# Patient Record
Sex: MALE | Race: OTHER | NOT HISPANIC OR LATINO
[De-identification: names, ages, dates, MRNs, and addresses within clinical notes are randomized per-mention and may not be internally consistent; named-entity substitution may affect disease eponyms.]

---

## 2017-02-16 ENCOUNTER — NON-APPOINTMENT (OUTPATIENT)
Age: 67
End: 2017-02-16

## 2017-02-16 ENCOUNTER — APPOINTMENT (OUTPATIENT)
Dept: CARDIOLOGY | Facility: HOSPITAL | Age: 67
End: 2017-02-16

## 2017-02-16 VITALS
DIASTOLIC BLOOD PRESSURE: 78 MMHG | BODY MASS INDEX: 24.83 KG/M2 | OXYGEN SATURATION: 97 % | RESPIRATION RATE: 16 BRPM | WEIGHT: 178 LBS | SYSTOLIC BLOOD PRESSURE: 165 MMHG | HEART RATE: 74 BPM

## 2017-02-16 RX ORDER — METOPROLOL TARTRATE 50 MG/1
50 TABLET, FILM COATED ORAL
Qty: 60 | Refills: 0 | Status: ACTIVE | COMMUNITY
Start: 2017-02-16

## 2017-02-16 RX ORDER — ASPIRIN 81 MG/1
81 TABLET ORAL DAILY
Qty: 30 | Refills: 0 | Status: ACTIVE | COMMUNITY
Start: 2017-02-16

## 2017-03-09 ENCOUNTER — APPOINTMENT (OUTPATIENT)
Dept: CV DIAGNOSTICS | Facility: HOSPITAL | Age: 67
End: 2017-03-09

## 2017-03-30 ENCOUNTER — APPOINTMENT (OUTPATIENT)
Dept: CARDIOLOGY | Facility: HOSPITAL | Age: 67
End: 2017-03-30

## 2017-03-30 VITALS
HEART RATE: 60 BPM | OXYGEN SATURATION: 97 % | BODY MASS INDEX: 25.24 KG/M2 | SYSTOLIC BLOOD PRESSURE: 125 MMHG | DIASTOLIC BLOOD PRESSURE: 74 MMHG | RESPIRATION RATE: 16 BRPM | WEIGHT: 181 LBS

## 2017-04-01 ENCOUNTER — OUTPATIENT (OUTPATIENT)
Dept: OUTPATIENT SERVICES | Facility: HOSPITAL | Age: 67
LOS: 1 days | End: 2017-04-01
Payer: MEDICAID

## 2017-04-01 DIAGNOSIS — Z98.89 OTHER SPECIFIED POSTPROCEDURAL STATES: Chronic | ICD-10-CM

## 2017-04-19 DIAGNOSIS — R69 ILLNESS, UNSPECIFIED: ICD-10-CM

## 2017-05-01 PROCEDURE — G9001: CPT

## 2017-05-24 ENCOUNTER — EMERGENCY (EMERGENCY)
Facility: HOSPITAL | Age: 67
LOS: 1 days | Discharge: HOME | End: 2017-05-24
Admitting: EMERGENCY MEDICINE
Payer: MEDICARE

## 2017-05-24 VITALS
OXYGEN SATURATION: 98 % | RESPIRATION RATE: 75 BRPM | SYSTOLIC BLOOD PRESSURE: 141 MMHG | DIASTOLIC BLOOD PRESSURE: 65 MMHG | TEMPERATURE: 98 F | HEART RATE: 85 BPM

## 2017-05-24 DIAGNOSIS — Z98.89 OTHER SPECIFIED POSTPROCEDURAL STATES: Chronic | ICD-10-CM

## 2017-05-24 LAB
ALBUMIN SERPL ELPH-MCNC: 4.3 G/DL — SIGNIFICANT CHANGE UP (ref 3.3–5)
ALP SERPL-CCNC: 73 U/L — SIGNIFICANT CHANGE UP (ref 40–120)
ALT FLD-CCNC: 36 U/L — SIGNIFICANT CHANGE UP (ref 4–41)
APPEARANCE UR: CLEAR — SIGNIFICANT CHANGE UP
AST SERPL-CCNC: 42 U/L — HIGH (ref 4–40)
B-OH-BUTYR SERPL-SCNC: 0.1 MMOL/L — SIGNIFICANT CHANGE UP (ref 0–0.4)
BASE EXCESS BLDV CALC-SCNC: 0.5 MMOL/L — SIGNIFICANT CHANGE UP
BASOPHILS # BLD AUTO: 0.03 K/UL — SIGNIFICANT CHANGE UP (ref 0–0.2)
BASOPHILS NFR BLD AUTO: 0.6 % — SIGNIFICANT CHANGE UP (ref 0–2)
BILIRUB SERPL-MCNC: 0.4 MG/DL — SIGNIFICANT CHANGE UP (ref 0.2–1.2)
BILIRUB UR-MCNC: NEGATIVE — SIGNIFICANT CHANGE UP
BLOOD GAS VENOUS - CREATININE: 0.73 MG/DL — SIGNIFICANT CHANGE UP (ref 0.5–1.3)
BLOOD UR QL VISUAL: NEGATIVE — SIGNIFICANT CHANGE UP
BUN SERPL-MCNC: 19 MG/DL — SIGNIFICANT CHANGE UP (ref 7–23)
CALCIUM SERPL-MCNC: 10 MG/DL — SIGNIFICANT CHANGE UP (ref 8.4–10.5)
CHLORIDE BLDV-SCNC: 98 MMOL/L — SIGNIFICANT CHANGE UP (ref 96–108)
CHLORIDE SERPL-SCNC: 94 MMOL/L — LOW (ref 98–107)
CO2 SERPL-SCNC: 26 MMOL/L — SIGNIFICANT CHANGE UP (ref 22–31)
COLOR SPEC: SIGNIFICANT CHANGE UP
CREAT SERPL-MCNC: 1.08 MG/DL — SIGNIFICANT CHANGE UP (ref 0.5–1.3)
EOSINOPHIL # BLD AUTO: 0.1 K/UL — SIGNIFICANT CHANGE UP (ref 0–0.5)
EOSINOPHIL NFR BLD AUTO: 2.1 % — SIGNIFICANT CHANGE UP (ref 0–6)
GAS PNL BLDV: 133 MMOL/L — LOW (ref 136–146)
GLUCOSE BLDV-MCNC: 391 — HIGH (ref 70–99)
GLUCOSE SERPL-MCNC: 474 MG/DL — CRITICAL HIGH (ref 70–99)
GLUCOSE UR-MCNC: >1000 — SIGNIFICANT CHANGE UP
HBA1C BLD-MCNC: 13.2 % — HIGH (ref 4–5.6)
HCO3 BLDV-SCNC: 23 MMOL/L — SIGNIFICANT CHANGE UP (ref 20–27)
HCT VFR BLD CALC: 37.1 % — LOW (ref 39–50)
HCT VFR BLDV CALC: 36.3 % — LOW (ref 39–51)
HGB BLD-MCNC: 11.7 G/DL — LOW (ref 13–17)
HGB BLDV-MCNC: 11.8 G/DL — LOW (ref 13–17)
IMM GRANULOCYTES NFR BLD AUTO: 0.2 % — SIGNIFICANT CHANGE UP (ref 0–1.5)
KETONES UR-MCNC: NEGATIVE — SIGNIFICANT CHANGE UP
LACTATE BLDV-MCNC: 2.9 MMOL/L — HIGH (ref 0.5–2)
LEUKOCYTE ESTERASE UR-ACNC: NEGATIVE — SIGNIFICANT CHANGE UP
LYMPHOCYTES # BLD AUTO: 2.15 K/UL — SIGNIFICANT CHANGE UP (ref 1–3.3)
LYMPHOCYTES # BLD AUTO: 44.9 % — HIGH (ref 13–44)
MCHC RBC-ENTMCNC: 27 PG — SIGNIFICANT CHANGE UP (ref 27–34)
MCHC RBC-ENTMCNC: 31.5 % — LOW (ref 32–36)
MCV RBC AUTO: 85.5 FL — SIGNIFICANT CHANGE UP (ref 80–100)
MONOCYTES # BLD AUTO: 0.56 K/UL — SIGNIFICANT CHANGE UP (ref 0–0.9)
MONOCYTES NFR BLD AUTO: 11.7 % — SIGNIFICANT CHANGE UP (ref 2–14)
NEUTROPHILS # BLD AUTO: 1.94 K/UL — SIGNIFICANT CHANGE UP (ref 1.8–7.4)
NEUTROPHILS NFR BLD AUTO: 40.5 % — LOW (ref 43–77)
NITRITE UR-MCNC: NEGATIVE — SIGNIFICANT CHANGE UP
PCO2 BLDV: 53 MMHG — HIGH (ref 41–51)
PH BLDV: 7.31 PH — LOW (ref 7.32–7.43)
PH UR: 6.5 — SIGNIFICANT CHANGE UP (ref 4.6–8)
PLATELET # BLD AUTO: 164 K/UL — SIGNIFICANT CHANGE UP (ref 150–400)
PMV BLD: 11.3 FL — SIGNIFICANT CHANGE UP (ref 7–13)
PO2 BLDV: 35 MMHG — SIGNIFICANT CHANGE UP (ref 35–40)
POTASSIUM BLDV-SCNC: 4.1 MMOL/L — SIGNIFICANT CHANGE UP (ref 3.4–4.5)
POTASSIUM SERPL-MCNC: 4.3 MMOL/L — SIGNIFICANT CHANGE UP (ref 3.5–5.3)
POTASSIUM SERPL-SCNC: 4.3 MMOL/L — SIGNIFICANT CHANGE UP (ref 3.5–5.3)
PROT SERPL-MCNC: 7.8 G/DL — SIGNIFICANT CHANGE UP (ref 6–8.3)
PROT UR-MCNC: NEGATIVE — SIGNIFICANT CHANGE UP
RBC # BLD: 4.34 M/UL — SIGNIFICANT CHANGE UP (ref 4.2–5.8)
RBC # FLD: 14.1 % — SIGNIFICANT CHANGE UP (ref 10.3–14.5)
SAO2 % BLDV: 59.7 % — LOW (ref 60–85)
SODIUM SERPL-SCNC: 134 MMOL/L — LOW (ref 135–145)
SP GR SPEC: 1.02 — SIGNIFICANT CHANGE UP (ref 1–1.03)
UROBILINOGEN FLD QL: NORMAL E.U. — SIGNIFICANT CHANGE UP (ref 0.1–0.2)
WBC # BLD: 4.79 K/UL — SIGNIFICANT CHANGE UP (ref 3.8–10.5)
WBC # FLD AUTO: 4.79 K/UL — SIGNIFICANT CHANGE UP (ref 3.8–10.5)

## 2017-05-24 PROCEDURE — 71260 CT THORAX DX C+: CPT | Mod: 26

## 2017-05-24 PROCEDURE — 99220: CPT | Mod: GC

## 2017-05-24 PROCEDURE — 71020: CPT | Mod: 26

## 2017-05-24 RX ORDER — AZITHROMYCIN 500 MG/1
500 TABLET, FILM COATED ORAL ONCE
Qty: 0 | Refills: 0 | Status: COMPLETED | OUTPATIENT
Start: 2017-05-24 | End: 2017-05-24

## 2017-05-24 RX ORDER — GABAPENTIN 400 MG/1
400 CAPSULE ORAL THREE TIMES A DAY
Qty: 0 | Refills: 0 | Status: DISCONTINUED | OUTPATIENT
Start: 2017-05-24 | End: 2017-05-28

## 2017-05-24 RX ORDER — SODIUM CHLORIDE 9 MG/ML
1000 INJECTION INTRAMUSCULAR; INTRAVENOUS; SUBCUTANEOUS ONCE
Qty: 0 | Refills: 0 | Status: COMPLETED | OUTPATIENT
Start: 2017-05-24 | End: 2017-05-24

## 2017-05-24 RX ORDER — MONTELUKAST 4 MG/1
10 TABLET, CHEWABLE ORAL DAILY
Qty: 0 | Refills: 0 | Status: DISCONTINUED | OUTPATIENT
Start: 2017-05-24 | End: 2017-05-28

## 2017-05-24 RX ORDER — FENOFIBRATE,MICRONIZED 130 MG
145 CAPSULE ORAL DAILY
Qty: 0 | Refills: 0 | Status: DISCONTINUED | OUTPATIENT
Start: 2017-05-24 | End: 2017-05-28

## 2017-05-24 RX ORDER — METOPROLOL TARTRATE 50 MG
50 TABLET ORAL
Qty: 0 | Refills: 0 | Status: DISCONTINUED | OUTPATIENT
Start: 2017-05-24 | End: 2017-05-28

## 2017-05-24 RX ORDER — FAMOTIDINE 10 MG/ML
20 INJECTION INTRAVENOUS
Qty: 0 | Refills: 0 | Status: DISCONTINUED | OUTPATIENT
Start: 2017-05-24 | End: 2017-05-28

## 2017-05-24 RX ORDER — IPRATROPIUM/ALBUTEROL SULFATE 18-103MCG
3 AEROSOL WITH ADAPTER (GRAM) INHALATION ONCE
Qty: 0 | Refills: 0 | Status: COMPLETED | OUTPATIENT
Start: 2017-05-24 | End: 2017-05-24

## 2017-05-24 RX ORDER — ASPIRIN/CALCIUM CARB/MAGNESIUM 324 MG
81 TABLET ORAL DAILY
Qty: 0 | Refills: 0 | Status: DISCONTINUED | OUTPATIENT
Start: 2017-05-24 | End: 2017-05-28

## 2017-05-24 RX ORDER — SIMVASTATIN 20 MG/1
20 TABLET, FILM COATED ORAL AT BEDTIME
Qty: 0 | Refills: 0 | Status: DISCONTINUED | OUTPATIENT
Start: 2017-05-24 | End: 2017-05-28

## 2017-05-24 RX ADMIN — SODIUM CHLORIDE 1000 MILLILITER(S): 9 INJECTION INTRAMUSCULAR; INTRAVENOUS; SUBCUTANEOUS at 23:55

## 2017-05-24 RX ADMIN — AZITHROMYCIN 500 MILLIGRAM(S): 500 TABLET, FILM COATED ORAL at 23:54

## 2017-05-24 RX ADMIN — SODIUM CHLORIDE 1000 MILLILITER(S): 9 INJECTION INTRAMUSCULAR; INTRAVENOUS; SUBCUTANEOUS at 17:49

## 2017-05-24 RX ADMIN — SODIUM CHLORIDE 1000 MILLILITER(S): 9 INJECTION INTRAMUSCULAR; INTRAVENOUS; SUBCUTANEOUS at 20:26

## 2017-05-24 RX ADMIN — Medication 3 MILLILITER(S): at 11:17

## 2017-05-24 RX ADMIN — SIMVASTATIN 20 MILLIGRAM(S): 20 TABLET, FILM COATED ORAL at 23:54

## 2017-05-24 NOTE — ED PROVIDER NOTE - PROGRESS NOTE DETAILS
MAHESH Sheets: pt improved s/p neb, pending CXR MAHESH jerome: CXR abnormal, spoke with Dr Valladares, possible that these are old changes however there is no previous CXR. Findings could represent infection but are suspicious for mass. Pt glucose also in 400s. Pt states he is on insulin 15 u BID and metformin daily, unsure of his finger sticks at home says his machine is "broken". Has never seen endo in the past. PMD is Dr. Car.

## 2017-05-24 NOTE — ED PROVIDER NOTE - ATTENDING CONTRIBUTION TO CARE
agree with PA note  65 yo M pmhx of DM HTN HLD here for cough x 1 week.  Denies travel, sick contacts, hemoptysis.    PE: well appearing, VSS. CTAB/L; s1 s2 no m/r/g abd soft/NT/ND

## 2017-05-24 NOTE — ED PROVIDER NOTE - MEDICAL DECISION MAKING DETAILS
65 yo M here for cough x 1 week. pt with faint expiratory wheezes on exam, VSS, otherwise well. PLAN: duonebs, cxr

## 2017-05-24 NOTE — ED PROVIDER NOTE - CHPI ED SYMPTOMS NEG
no wheezing/no headache/no hemoptysis/no edema/no diaphoresis/no chest pain/no body aches/no shortness of breath/no chills

## 2017-05-24 NOTE — ED PROVIDER NOTE - OBJECTIVE STATEMENT
67 yo M pmhx of DM HTN HLD here for cough x 1 week. Pt reports over the past week he has had a dry non productive cough. No meds taken for cough. Felt tactile temp last night, took tylenol. Denies sick contacts/travel. Able to tolerate PO. Normal UOP and BM. Pt states he is otherwise well but the cough bothers him when he sleeps. Denies vomiting, diarrhea, cp, SOB, dizziness, HA, weakness, abdominal pain, rash 65 yo M pmhx of DM HTN HLD here for cough x 1 week. Pt reports over the past week he has had a dry non productive cough. No meds taken for cough. Felt tactile temp last night, took tylenol. Denies sick contacts/travel. Able to tolerate PO. Normal UOP and BM. Pt states he is otherwise well but the cough bothers him when he sleeps. Denies travel/sick contacts. Denies hx of TB, contact with TB patient. Denies vomiting, diarrhea, cp, SOB, dizziness, HA, weakness, abdominal pain, rash

## 2017-05-25 VITALS
TEMPERATURE: 98 F | OXYGEN SATURATION: 97 % | DIASTOLIC BLOOD PRESSURE: 78 MMHG | RESPIRATION RATE: 14 BRPM | HEART RATE: 73 BPM | SYSTOLIC BLOOD PRESSURE: 143 MMHG

## 2017-05-25 DIAGNOSIS — Z98.890 OTHER SPECIFIED POSTPROCEDURAL STATES: Chronic | ICD-10-CM

## 2017-05-25 DIAGNOSIS — I10 ESSENTIAL (PRIMARY) HYPERTENSION: ICD-10-CM

## 2017-05-25 DIAGNOSIS — E11.8 TYPE 2 DIABETES MELLITUS WITH UNSPECIFIED COMPLICATIONS: ICD-10-CM

## 2017-05-25 DIAGNOSIS — E78.5 HYPERLIPIDEMIA, UNSPECIFIED: ICD-10-CM

## 2017-05-25 LAB
BASE EXCESS BLDV CALC-SCNC: 2.4 MMOL/L — SIGNIFICANT CHANGE UP
BLOOD GAS VENOUS - CREATININE: 0.72 MG/DL — SIGNIFICANT CHANGE UP (ref 0.5–1.3)
CHLORIDE BLDV-SCNC: 108 MMOL/L — SIGNIFICANT CHANGE UP (ref 96–108)
GAS PNL BLDV: 135 MMOL/L — LOW (ref 136–146)
GLUCOSE BLDV-MCNC: 240 — HIGH (ref 70–99)
HCO3 BLDV-SCNC: 25 MMOL/L — SIGNIFICANT CHANGE UP (ref 20–27)
HCT VFR BLDV CALC: 32.8 % — LOW (ref 39–51)
HGB BLDV-MCNC: 10.6 G/DL — LOW (ref 13–17)
LACTATE BLDV-MCNC: 1.8 MMOL/L — SIGNIFICANT CHANGE UP (ref 0.5–2)
PCO2 BLDV: 51 MMHG — SIGNIFICANT CHANGE UP (ref 41–51)
PH BLDV: 7.35 PH — SIGNIFICANT CHANGE UP (ref 7.32–7.43)
PO2 BLDV: 31 MMHG — LOW (ref 35–40)
POTASSIUM BLDV-SCNC: 4.4 MMOL/L — SIGNIFICANT CHANGE UP (ref 3.4–4.5)
SAO2 % BLDV: 51.5 % — LOW (ref 60–85)

## 2017-05-25 PROCEDURE — 99285 EMERGENCY DEPT VISIT HI MDM: CPT | Mod: GC

## 2017-05-25 PROCEDURE — 99217: CPT | Mod: GC

## 2017-05-25 RX ORDER — INSULIN LISPRO 100/ML
VIAL (ML) SUBCUTANEOUS
Qty: 0 | Refills: 0 | Status: DISCONTINUED | OUTPATIENT
Start: 2017-05-25 | End: 2017-05-28

## 2017-05-25 RX ORDER — INSULIN LISPRO 100/ML
6 VIAL (ML) SUBCUTANEOUS
Qty: 0 | Refills: 0 | Status: DISCONTINUED | OUTPATIENT
Start: 2017-05-25 | End: 2017-05-28

## 2017-05-25 RX ORDER — ALBUTEROL 90 UG/1
2 AEROSOL, METERED ORAL
Qty: 1 | Refills: 0 | OUTPATIENT
Start: 2017-05-25 | End: 2017-06-24

## 2017-05-25 RX ORDER — DEXTROSE 50 % IN WATER 50 %
25 SYRINGE (ML) INTRAVENOUS ONCE
Qty: 0 | Refills: 0 | Status: DISCONTINUED | OUTPATIENT
Start: 2017-05-25 | End: 2017-05-28

## 2017-05-25 RX ORDER — INSULIN LISPRO 100/ML
5 VIAL (ML) SUBCUTANEOUS
Qty: 0 | Refills: 0 | Status: DISCONTINUED | OUTPATIENT
Start: 2017-05-25 | End: 2017-05-25

## 2017-05-25 RX ORDER — ALBUTEROL 90 UG/1
2 AEROSOL, METERED ORAL ONCE
Qty: 0 | Refills: 0 | Status: COMPLETED | OUTPATIENT
Start: 2017-05-25 | End: 2017-05-25

## 2017-05-25 RX ORDER — INSULIN LISPRO 100/ML
8 VIAL (ML) SUBCUTANEOUS
Qty: 1 | Refills: 0 | OUTPATIENT
Start: 2017-05-25 | End: 2017-06-24

## 2017-05-25 RX ORDER — SODIUM CHLORIDE 9 MG/ML
1000 INJECTION, SOLUTION INTRAVENOUS
Qty: 0 | Refills: 0 | Status: DISCONTINUED | OUTPATIENT
Start: 2017-05-25 | End: 2017-05-28

## 2017-05-25 RX ORDER — INSULIN LISPRO 100/ML
VIAL (ML) SUBCUTANEOUS AT BEDTIME
Qty: 0 | Refills: 0 | Status: DISCONTINUED | OUTPATIENT
Start: 2017-05-25 | End: 2017-05-28

## 2017-05-25 RX ORDER — ENOXAPARIN SODIUM 100 MG/ML
20 INJECTION SUBCUTANEOUS
Qty: 1 | Refills: 0 | OUTPATIENT
Start: 2017-05-25 | End: 2017-06-24

## 2017-05-25 RX ORDER — GLUCAGON INJECTION, SOLUTION 0.5 MG/.1ML
1 INJECTION, SOLUTION SUBCUTANEOUS ONCE
Qty: 0 | Refills: 0 | Status: DISCONTINUED | OUTPATIENT
Start: 2017-05-25 | End: 2017-05-28

## 2017-05-25 RX ORDER — INSULIN GLARGINE 100 [IU]/ML
16 INJECTION, SOLUTION SUBCUTANEOUS EVERY MORNING
Qty: 0 | Refills: 0 | Status: DISCONTINUED | OUTPATIENT
Start: 2017-05-25 | End: 2017-05-28

## 2017-05-25 RX ORDER — DEXTROSE 50 % IN WATER 50 %
1 SYRINGE (ML) INTRAVENOUS ONCE
Qty: 0 | Refills: 0 | Status: DISCONTINUED | OUTPATIENT
Start: 2017-05-25 | End: 2017-05-28

## 2017-05-25 RX ORDER — DEXTROSE 50 % IN WATER 50 %
12.5 SYRINGE (ML) INTRAVENOUS ONCE
Qty: 0 | Refills: 0 | Status: DISCONTINUED | OUTPATIENT
Start: 2017-05-25 | End: 2017-05-28

## 2017-05-25 RX ADMIN — GABAPENTIN 400 MILLIGRAM(S): 400 CAPSULE ORAL at 14:06

## 2017-05-25 RX ADMIN — Medication 81 MILLIGRAM(S): at 11:22

## 2017-05-25 RX ADMIN — FAMOTIDINE 20 MILLIGRAM(S): 10 INJECTION INTRAVENOUS at 18:19

## 2017-05-25 RX ADMIN — Medication 5 UNIT(S): at 12:26

## 2017-05-25 RX ADMIN — Medication 5 UNIT(S): at 09:38

## 2017-05-25 RX ADMIN — ALBUTEROL 2 PUFF(S): 90 AEROSOL, METERED ORAL at 10:09

## 2017-05-25 RX ADMIN — FAMOTIDINE 20 MILLIGRAM(S): 10 INJECTION INTRAVENOUS at 06:24

## 2017-05-25 RX ADMIN — Medication 50 MILLIGRAM(S): at 18:18

## 2017-05-25 RX ADMIN — GABAPENTIN 400 MILLIGRAM(S): 400 CAPSULE ORAL at 06:24

## 2017-05-25 RX ADMIN — INSULIN GLARGINE 16 UNIT(S): 100 INJECTION, SOLUTION SUBCUTANEOUS at 09:37

## 2017-05-25 RX ADMIN — Medication 50 MILLIGRAM(S): at 06:24

## 2017-05-25 RX ADMIN — Medication: at 12:25

## 2017-05-25 RX ADMIN — Medication 145 MILLIGRAM(S): at 11:22

## 2017-05-25 RX ADMIN — Medication: at 17:57

## 2017-05-25 RX ADMIN — MONTELUKAST 10 MILLIGRAM(S): 4 TABLET, CHEWABLE ORAL at 11:22

## 2017-05-25 RX ADMIN — Medication: at 09:38

## 2017-05-25 RX ADMIN — Medication 6 UNIT(S): at 17:58

## 2017-05-25 NOTE — CONSULT NOTE ADULT - SUBJECTIVE AND OBJECTIVE BOX
Endo:Denies    HPI:65 yo M with DM2, HTN, HLD admitted with cough.  Diagnosed with DM2 15 years ago.  Admits to neuropathy.  Reports compliance with diabetic regimen as outlined below except for  one day a week when he does not take his medication as he "does not feel well"  Non compliant with diabetic diet.  Fasting -215.          PAST MEDICAL & SURGICAL HISTORY:  HLD (hyperlipidemia)  DM (diabetes mellitus)  H/O elbow surgery  History of coronary angiogram      FAMILY HISTORY:  Family history of diabetes mellitus (Father, Mother)        Social History:    Outpatient Medications:  lantus 15 u BID  metformin 1000 mg BID  glipizide 5mg po BID  januvia 100mg po qd  metoprolol    MEDICATIONS  (STANDING):  famotidine    Tablet 20milliGRAM(s) Oral two times a day  gabapentin 400milliGRAM(s) Oral three times a day  fenofibrate Tablet 145milliGRAM(s) Oral daily  simvastatin 20milliGRAM(s) Oral at bedtime  montelukast 10milliGRAM(s) Oral daily  aspirin  chewable 81milliGRAM(s) Oral daily  metoprolol 50milliGRAM(s) Oral two times a day  insulin glargine Injectable (LANTUS) 16Unit(s) SubCutaneous every morning  insulin lispro (HumaLOG) corrective regimen sliding scale  SubCutaneous three times a day before meals  insulin lispro (HumaLOG) corrective regimen sliding scale  SubCutaneous at bedtime  dextrose 5%. 1000milliLiter(s) IV Continuous <Continuous>  dextrose 50% Injectable 12.5Gram(s) IV Push once  dextrose 50% Injectable 25Gram(s) IV Push once  dextrose 50% Injectable 25Gram(s) IV Push once  insulin lispro Injectable (HumaLOG) 6Unit(s) SubCutaneous three times a day before meals    MEDICATIONS  (PRN):  dextrose Gel 1Dose(s) Oral once PRN Blood Glucose LESS THAN 70 milliGRAM(s)/deciliter  glucagon  Injectable 1milliGRAM(s) IntraMuscular once PRN Glucose LESS THAN 70 milligrams/deciliter      Allergies    No Known Allergies    Intolerances    Motrin (Stomach Upset)    Review of Systems:  Constitutional: fever  Eyes:   Neuro:   HEENT:   Cardiovascular:   Respiratory: cough  GI:   :   Skin:   Psych:   Endocrine:   Hem/lymph:   Osteoporosis:     ALL OTHER SYSTEMS REVIEWED AND NEGATIVE    UNABLE TO OBTAIN    PHYSICAL EXAM:  VITALS: T(C): 36.8  T(F): 98.3, Max: 98.8 (05-24 @ 19:44)  HR: 73 (65 - 96)  BP: 143/78 (119/66 - 145/56)  RR:  (14 - 20)  SpO2:  (97% - 100%)  Wt(kg): --172 lbs  GENERAL: NAD,   EYES: eomi  HEENT:  no cervical lymphadenopathy  THYROID: no thyromegaly, no TTP  RESPIRATORY: Clear to auscultation bilaterally;   CARDIOVASCULAR: irregular +s1,s2  GI: Soft, nontender, non distended+BS  SKIN: + acanthosis nigracans  MUSCULOSKELETAL: no pedal edema  NEURO: moves all extremities  PSYCH: Alert and oriented x 3, normal affect, normal mood  CUSHING'S SIGNS: none    CAPILLARY BLOOD GLUCOSE  263 (05-25 @ 12:20)  210 (05-25 @ 09:10)  244 (05-24 @ 22:41)                            11.7   4.79  )-----------( 164      ( 24 May 2017 16:45 )             37.1       05-24    134<L>  |  94<L>  |  19  ----------------------------<  474<HH>  4.3   |  26  |  1.08    EGFR if : 82  EGFR if non : 71    Ca    10.0      05-24    TPro  7.8  /  Alb  4.3  /  TBili  0.4  /  DBili  x   /  AST  42<H>  /  ALT  36  /  AlkPhos  73  05-24      Thyroid Function Tests:      Hemoglobin A1C, Whole Blood: 13.2 % <H> [4.0 - 5.6] (05-24 @ 16:38)          Radiology: Endo:Denies    HPI:67 yo M with DM2, HTN, HLD admitted with cough.  Diagnosed with DM2 15 years ago.  Admits to neuropathy.  Reports compliance with diabetic regimen as outlined below except for  one day a week when he does not take his medication as he "does not feel well"  Non compliant with diabetic diet.  Fasting -215.          PAST MEDICAL & SURGICAL HISTORY:  HLD (hyperlipidemia)  DM (diabetes mellitus)  H/O elbow surgery  History of coronary angiogram      FAMILY HISTORY:  Family history of diabetes mellitus (Father, Mother)        Social History:    Outpatient Medications:  lantus 15 u BID  metformin 1000 mg BID  glipizide 5mg po BID  januvia 100mg po qd  metoprolol    MEDICATIONS  (STANDING):  famotidine    Tablet 20milliGRAM(s) Oral two times a day  gabapentin 400milliGRAM(s) Oral three times a day  fenofibrate Tablet 145milliGRAM(s) Oral daily  simvastatin 20milliGRAM(s) Oral at bedtime  montelukast 10milliGRAM(s) Oral daily  aspirin  chewable 81milliGRAM(s) Oral daily  metoprolol 50milliGRAM(s) Oral two times a day  insulin glargine Injectable (LANTUS) 16Unit(s) SubCutaneous every morning  insulin lispro (HumaLOG) corrective regimen sliding scale  SubCutaneous three times a day before meals  insulin lispro (HumaLOG) corrective regimen sliding scale  SubCutaneous at bedtime  dextrose 5%. 1000milliLiter(s) IV Continuous <Continuous>  dextrose 50% Injectable 12.5Gram(s) IV Push once  dextrose 50% Injectable 25Gram(s) IV Push once  dextrose 50% Injectable 25Gram(s) IV Push once  insulin lispro Injectable (HumaLOG) 6Unit(s) SubCutaneous three times a day before meals    MEDICATIONS  (PRN):  dextrose Gel 1Dose(s) Oral once PRN Blood Glucose LESS THAN 70 milliGRAM(s)/deciliter  glucagon  Injectable 1milliGRAM(s) IntraMuscular once PRN Glucose LESS THAN 70 milligrams/deciliter      Allergies    No Known Allergies    Intolerances    Motrin (Stomach Upset)    Review of Systems:  Constitutional: fever  Eyes:   Neuro:   HEENT:   Cardiovascular:   Respiratory: cough  GI:   :   Skin:   Psych:   Endocrine:   Hem/lymph:   Osteoporosis:     ALL OTHER SYSTEMS REVIEWED AND NEGATIVE      PHYSICAL EXAM:  VITALS: T(C): 36.8  T(F): 98.3, Max: 98.8 (05-24 @ 19:44)  HR: 73 (65 - 96)  BP: 143/78 (119/66 - 145/56)  RR:  (14 - 20)  SpO2:  (97% - 100%)  Wt(kg): --172 lbs  GENERAL: NAD,   EYES: eomi  HEENT:  no cervical lymphadenopathy  THYROID: no thyromegaly, no TTP  RESPIRATORY: Clear to auscultation bilaterally;   CARDIOVASCULAR: irregular +s1,s2  GI: Soft, nontender, non distended+BS  SKIN: + acanthosis nigracans  MUSCULOSKELETAL: no pedal edema  NEURO: moves all extremities  PSYCH: Alert and oriented x 3, normal affect, normal mood  CUSHING'S SIGNS: none    CAPILLARY BLOOD GLUCOSE  263 (05-25 @ 12:20)  210 (05-25 @ 09:10)  244 (05-24 @ 22:41)                            11.7   4.79  )-----------( 164      ( 24 May 2017 16:45 )             37.1       05-24    134<L>  |  94<L>  |  19  ----------------------------<  474<HH>  4.3   |  26  |  1.08    EGFR if : 82  EGFR if non : 71    Ca    10.0      05-24    TPro  7.8  /  Alb  4.3  /  TBili  0.4  /  DBili  x   /  AST  42<H>  /  ALT  36  /  AlkPhos  73  05-24      Thyroid Function Tests:      Hemoglobin A1C, Whole Blood: 13.2 % <H> [4.0 - 5.6] (05-24 @ 16:38)          Radiology:

## 2017-05-25 NOTE — ED CDU PROVIDER NOTE - OBJECTIVE STATEMENT
67 yo M pmhx of DM HTN HLD here for cough x 1 week. Pt reports over the past week he has had a dry non productive cough. No meds taken for cough. Felt tactile temp last night, took tylenol. Denies sick contacts/travel. Able to tolerate PO. Normal UOP and BM. Pt states he is otherwise well but the cough bothers him when he sleeps. Denies travel/sick contacts. Denies hx of TB, contact with TB patient. Denies vomiting, diarrhea, cp, SOB, dizziness, HA, weakness, abdominal pain, rash    CDU MAHESH Valenzuela: 65 y/o F PMHx HTN, HLD, DM presenting with cough for several days associated w/subjective fevers. No recent traveling or sick contacts. In the main ED, cxr and ct chest both done showing questionable ground glass nodules, requiring follow up in one year. Pt was also found to be hyperglycemic with an A1C of 13.2. Pt sent to the CDU for endo consult and serial fingersticks. Pt is currently feeling better after getting duonebs in the main ED, but would like cough medication. Denies CP or SOB.

## 2017-05-25 NOTE — ED CDU PROVIDER NOTE - ATTENDING CONTRIBUTION TO CARE
Pt was seen and evaluated by me in the ED. Pt notes having a non-productive cough for the past week. Admits to an episode of a tactile fever. Pt denies any SOB, chest pain, or abd pain. Lungs CTA b/l. RRR. Abd soft, non-tender. Posterior pharynx clear, no exudate or erythema. Noted to have elevated A1C with history of DM. Pt had questionable CXR but CT showed only ground glass appearance. Sent to CDU for symptom control and Endocrine evaluation.

## 2017-05-25 NOTE — ED CDU PROVIDER NOTE - CHPI ED SYMPTOMS NEG
no body aches/no diaphoresis/no edema/no wheezing/no hemoptysis/no shortness of breath/no chest pain/no chills/no headache

## 2017-05-25 NOTE — CONSULT NOTE ADULT - PROBLEM SELECTOR RECOMMENDATION 9
HbA1c 13.2. Patient is for discharge. Would give lantus 20 and humalog 8 tidac. He can follow up at [address] [place] [address] as outpatient for further management. He will discontinue all oral agents for diabetes HbA1c 13.2. Patient is for discharge. Would give lantus 20 and humalog 8 tidac. He can follow up at [address] [place] [address] as outpatient for further management. He will only continue metformin 1000 mg BID. Discontinue all other oral diabetic medications.

## 2017-05-25 NOTE — CONSULT NOTE ADULT - ATTENDING COMMENTS
66M uncontrolled DM2.  Agree with basal bolus and metformin as outlined above.  Follow up with endocrine as outpatient.

## 2017-05-25 NOTE — CONSULT NOTE ADULT - SUBJECTIVE AND OBJECTIVE BOX
Endo: None    HPI: 65 yo M with DM2, HTN, HLD admitted with cough    Diagnosed with DM2 15 years ago.   Admits to neuropathy  Non compliant with diabetic diet.  Reports compliance with diabetic regimen as outlined below except for  approximately one day per week where he will not take any of his medications because he is not feeling well.  Checks FS once daily. fasting 170-215.      PAST MEDICAL & SURGICAL HISTORY:  HLD (hyperlipidemia)  DM (diabetes mellitus)  H/O elbow surgery  History of coronary angiogram      FAMILY HISTORY:  Family history of diabetes mellitus (Father, Mother)        Social History:  former smoker    Outpatient Medications:  lantus 15 u BID, metformin 1000mg BID, glipizide 5mg BID, januvia 100mg po qd, metoprolol      MEDICATIONS  (STANDING):  famotidine    Tablet 20milliGRAM(s) Oral two times a day  gabapentin 400milliGRAM(s) Oral three times a day  fenofibrate Tablet 145milliGRAM(s) Oral daily  simvastatin 20milliGRAM(s) Oral at bedtime  montelukast 10milliGRAM(s) Oral daily  aspirin  chewable 81milliGRAM(s) Oral daily  metoprolol 50milliGRAM(s) Oral two times a day  insulin glargine Injectable (LANTUS) 16Unit(s) SubCutaneous every morning  insulin lispro (HumaLOG) corrective regimen sliding scale  SubCutaneous three times a day before meals  insulin lispro (HumaLOG) corrective regimen sliding scale  SubCutaneous at bedtime  dextrose 5%. 1000milliLiter(s) IV Continuous <Continuous>  dextrose 50% Injectable 12.5Gram(s) IV Push once  dextrose 50% Injectable 25Gram(s) IV Push once  dextrose 50% Injectable 25Gram(s) IV Push once    MEDICATIONS  (PRN):  dextrose Gel 1Dose(s) Oral once PRN Blood Glucose LESS THAN 70 milliGRAM(s)/deciliter  glucagon  Injectable 1milliGRAM(s) IntraMuscular once PRN Glucose LESS THAN 70 milligrams/deciliter      Allergies    No Known Allergies    Intolerances    Motrin (Stomach Upset)    Review of Systems:  Constitutional: fevers  Eyes:   Neuro:   HEENT:   Cardiovascular:   Respiratory: cough  GI:   :   Skin:   Psych:   Endocrine:   Hem/lymph:   Osteoporosis:     ALL OTHER SYSTEMS REVIEWED AND NEGATIVE        PHYSICAL EXAM:  VITALS: T(C): 36.8  T(F): 98.3, Max: 98.8 (05-24 @ 19:44)  HR: 65 (65 - 96)  BP: 143/76 (119/66 - 145/56)  RR:  (14 - 20)  SpO2:  (98% - 100%)  Wt(kg): --  GENERAL: NAD,   EYES: eomi  HEENT:  no cervical lymphadenopathy  THYROID: no thyromegaly or TTP  RESPIRATORY: Clear to auscultation bilaterally;   CARDIOVASCULAR: Regular rate and rhythm; +s1,s2,rrr  GI: Soft, nontender, nondistended, +BS  SKIN: + acanthosis nigrans   MUSCULOSKELETAL:   NEURO:   PSYCH: Alert and oriented x 3, normal affect, normal mood  CUSHING'S SIGNS:     CAPILLARY BLOOD GLUCOSE  263 (05-25 @ 12:20)  210 (05-25 @ 09:10)  244 (05-24 @ 22:41)                            11.7   4.79  )-----------( 164      ( 24 May 2017 16:45 )             37.1       05-24    134<L>  |  94<L>  |  19  ----------------------------<  474<HH>  4.3   |  26  |  1.08    EGFR if : 82  EGFR if non : 71    Ca    10.0      05-24    TPro  7.8  /  Alb  4.3  /  TBili  0.4  /  DBili  x   /  AST  42<H>  /  ALT  36  /  AlkPhos  73  05-24      Thyroid Function Tests:      Hemoglobin A1C, Whole Blood: 13.2 % <H> [4.0 - 5.6] (05-24 @ 16:38)          Radiology:

## 2017-05-25 NOTE — ED CDU PROVIDER NOTE - PLAN OF CARE
Follow up with Endocrinology within ... Take antibiotics for cough as prescribed. Finish full course. Use inhaler as needed for shortness of breathe. Take your diabetes medications as prescribed. Refer to Diabetes information given regarding meal planning. Return to ER for worsening symptoms, fever, chills, night sweats, bloody cough, nausea, vomiting, lightheadedness, confusion, weakness, shortness of breathe, chest pain or any other concerns. Follow up with Endocrinology within 24-48 hours, call [phone] to make an appointment. Take antibiotics for cough as prescribed. Finish full course. Use inhaler as needed for shortness of breathe. For diabetes take 1,000mg Metformin twice a day, Lantus 20 units every morning and Humalog 8 units three times a day before meals, as prescribed. STOP all other diabetes medications you were taking at home as per Endochrinology. Refer to Diabetes information given regarding meal planning. Return to ER for worsening symptoms, fever, chills, night sweats, bloody cough, nausea, vomiting, lightheadedness, confusion, weakness, shortness of breathe, chest pain or any other concerns.

## 2017-05-25 NOTE — ED CDU PROVIDER NOTE - PROGRESS NOTE DETAILS
MAHESH Valenzuela: Endo recommending Lantus 16U in the AM, Humalog 5U TID w/ low dose sliding scale. Discharge CDU NOTE   pt awaiting endo this AM and not in acute distress tolerated po. ***GEN - NAD; well appearing; A+O x3 ***HEAD - NC/AT ***PULMONARY - CTA b/l, symmetric breath sounds. ***CARDIAC -s1s2, RRR, no M,G,R***ABDOMEN - +BS, ND, NT, soft, ***PSYCH - insight and judgment nl.. Plan pt mentions he was admitted at [facility] 2 wks ago thus will switch abx from azithro to Leviquin.   Dr. Yee: I performed a face to face bedside interview with patient regarding history of present illness, review of symptoms and past medical history. I completed an independent physical exam.  I have discussed patient's plan of care with PA.   I agree with note as stated above, having amended the EMR as needed to reflect my findings.   This includes HISTORY OF PRESENT ILLNESS, HIV, PAST MEDICAL/SURGICAL/FAMILY/SOCIAL HISTORY, ALLERGIES AND HOME MEDICATIONS, REVIEW OF SYSTEMS, PHYSICAL EXAM, and any PROGRESS NOTES during the time I functioned as the attending physician for this patient. Return to the ER immediately for any worsening symptoms, concerns, chest pain, fevers, shortness of breath, vomiting, abdominal pain, rashes, neck pain, back pain, numbness, paresthesias, pain or any difficulties at all.  Please follow up with your own private physician or our medical clinic at [phone] in the next 2-3 days.  Find a doctor at [phone].  Copies of your tests were provided to you for follow-up.  You must address all your findings with your doctor. MAHESH Elizondo - received sign out from MAHESH Uriarte. pt seen and examined by dr. snachez and I. 67 y/o male with hx of DM, HTN, HLD p/w cough x 1 week. CT scan MAHESH Elizondo - received sign out from MAHESH Uriarte. pt seen and examined by dr. yee and I. 65 y/o male with hx of DM, HTN, HLD p/w cough x 1 week. CT scan revealed ground glass nodes recommending f.u within 1 year. pt admits to cough overnight, and being admitted 2 weeks ago. Lungs clear, NAD, no new complaints, fever or chills. Agreed to switch to levaquin with Dr. Yee. f/w A1C of 13.2 despite home regime. Awaiting endo reconsult today. MAHESH Elizondo - received sign out from MAHESH Uriarte. pt seen and examined by dr. yee and I. 65 y/o male with hx of DM, HTN, HLD p/w cough x 1 week. CT scan revealed ground glass nodes recommending f.u within 1 year. pt admits to cough overnight, and being admitted 2 weeks ago. Lungs clear, NAD, no new complaints, fever or chills. Dr. Yee suggesting Levaquin for possible hospital acquired coverage. f/w A1C of 13.2 despite home regime. Awaiting endo reconsult today. MAHESH Elizondo - as per endocrinology, saw pt in CDU, recommending to change humalog from 5 units TID to 6 units TID. will let me know further recommendations when rounding in an hour. Discharge Note    pt reamined stable awaiting finalized endo recs and will be discharge with abx for HCAP.   Dr. Yee: I performed a face to face bedside interview with patient regarding history of present illness, review of symptoms and past medical history. I completed an independent physical exam.  I have discussed patient's plan of care with PA.   I agree with note as stated above, having amended the EMR as needed to reflect my findings.   This includes HISTORY OF PRESENT ILLNESS, HIV, PAST MEDICAL/SURGICAL/FAMILY/SOCIAL HISTORY, ALLERGIES AND HOME MEDICATIONS, REVIEW OF SYSTEMS, PHYSICAL EXAM, and any PROGRESS NOTES during the time I functioned as the attending physician for this patient.   Return to the ER immediately for any worsening symptoms, concerns, chest pain, fevers, shortness of breath, vomiting, abdominal pain, rashes, neck pain, back pain, numbness, paresthesias, pain or any difficulties at all.  Please follow up with your own private physician or our medical clinic at [phone] in the next 2-3 days.  Find a doctor at [phone].  Copies of your tests were provided to you for follow-up.  You must address all your findings with your doctor. MAHESH Elizondo - as per endocrinology, recommending Lantus 20 units once every morning, Humalog 8 units TID before meals and continue Metformin 1,000 BID which pt has pills at home. Recommending to stop other diabetes medications and f.u as outpt.

## 2017-07-01 ENCOUNTER — INPATIENT (INPATIENT)
Facility: HOSPITAL | Age: 67
LOS: 5 days | Discharge: HOME | End: 2017-07-07
Attending: HOSPITALIST | Admitting: HOSPITALIST
Payer: MEDICARE

## 2017-07-01 VITALS
HEART RATE: 69 BPM | SYSTOLIC BLOOD PRESSURE: 164 MMHG | TEMPERATURE: 98 F | RESPIRATION RATE: 18 BRPM | OXYGEN SATURATION: 99 % | DIASTOLIC BLOOD PRESSURE: 80 MMHG

## 2017-07-01 DIAGNOSIS — Z98.890 OTHER SPECIFIED POSTPROCEDURAL STATES: Chronic | ICD-10-CM

## 2017-07-01 DIAGNOSIS — Z98.89 OTHER SPECIFIED POSTPROCEDURAL STATES: Chronic | ICD-10-CM

## 2017-07-01 LAB
ALBUMIN SERPL ELPH-MCNC: 4.6 G/DL — SIGNIFICANT CHANGE UP (ref 3.3–5)
ALP SERPL-CCNC: 66 U/L — SIGNIFICANT CHANGE UP (ref 40–120)
ALT FLD-CCNC: 32 U/L — SIGNIFICANT CHANGE UP (ref 4–41)
APTT BLD: 31.6 SEC — SIGNIFICANT CHANGE UP (ref 27.5–37.4)
AST SERPL-CCNC: 29 U/L — SIGNIFICANT CHANGE UP (ref 4–40)
BASE EXCESS BLDV CALC-SCNC: 2.3 MMOL/L — SIGNIFICANT CHANGE UP
BASOPHILS # BLD AUTO: 0.05 K/UL — SIGNIFICANT CHANGE UP (ref 0–0.2)
BASOPHILS NFR BLD AUTO: 0.7 % — SIGNIFICANT CHANGE UP (ref 0–2)
BILIRUB SERPL-MCNC: 0.4 MG/DL — SIGNIFICANT CHANGE UP (ref 0.2–1.2)
BLOOD GAS VENOUS - CREATININE: 0.72 MG/DL — SIGNIFICANT CHANGE UP (ref 0.5–1.3)
BUN SERPL-MCNC: 19 MG/DL — SIGNIFICANT CHANGE UP (ref 7–23)
CALCIUM SERPL-MCNC: 10.5 MG/DL — SIGNIFICANT CHANGE UP (ref 8.4–10.5)
CHLORIDE BLDV-SCNC: 99 MMOL/L — SIGNIFICANT CHANGE UP (ref 96–108)
CHLORIDE SERPL-SCNC: 93 MMOL/L — LOW (ref 98–107)
CK MB BLD-MCNC: 2.1 NG/ML — SIGNIFICANT CHANGE UP (ref 1–6.6)
CK MB BLD-MCNC: SIGNIFICANT CHANGE UP (ref 0–2.5)
CK SERPL-CCNC: 79 U/L — SIGNIFICANT CHANGE UP (ref 30–200)
CO2 SERPL-SCNC: 27 MMOL/L — SIGNIFICANT CHANGE UP (ref 22–31)
CREAT SERPL-MCNC: 1.03 MG/DL — SIGNIFICANT CHANGE UP (ref 0.5–1.3)
EOSINOPHIL # BLD AUTO: 0.07 K/UL — SIGNIFICANT CHANGE UP (ref 0–0.5)
EOSINOPHIL NFR BLD AUTO: 0.9 % — SIGNIFICANT CHANGE UP (ref 0–6)
GAS PNL BLDV: 131 MMOL/L — LOW (ref 136–146)
GLUCOSE BLDV-MCNC: 332 — HIGH (ref 70–99)
GLUCOSE SERPL-MCNC: 409 MG/DL — HIGH (ref 70–99)
HCO3 BLDV-SCNC: 25 MMOL/L — SIGNIFICANT CHANGE UP (ref 20–27)
HCT VFR BLD CALC: 40 % — SIGNIFICANT CHANGE UP (ref 39–50)
HCT VFR BLDV CALC: 38.8 % — LOW (ref 39–51)
HGB BLD-MCNC: 13 G/DL — SIGNIFICANT CHANGE UP (ref 13–17)
HGB BLDV-MCNC: 12.6 G/DL — LOW (ref 13–17)
IMM GRANULOCYTES # BLD AUTO: 0.02 # — SIGNIFICANT CHANGE UP
IMM GRANULOCYTES NFR BLD AUTO: 0.3 % — SIGNIFICANT CHANGE UP (ref 0–1.5)
INR BLD: 0.99 — SIGNIFICANT CHANGE UP (ref 0.88–1.17)
LACTATE BLDV-MCNC: 2.3 MMOL/L — HIGH (ref 0.5–2)
LYMPHOCYTES # BLD AUTO: 3.5 K/UL — HIGH (ref 1–3.3)
LYMPHOCYTES # BLD AUTO: 46.7 % — HIGH (ref 13–44)
MCHC RBC-ENTMCNC: 26.9 PG — LOW (ref 27–34)
MCHC RBC-ENTMCNC: 32.5 % — SIGNIFICANT CHANGE UP (ref 32–36)
MCV RBC AUTO: 82.8 FL — SIGNIFICANT CHANGE UP (ref 80–100)
MONOCYTES # BLD AUTO: 0.69 K/UL — SIGNIFICANT CHANGE UP (ref 0–0.9)
MONOCYTES NFR BLD AUTO: 9.2 % — SIGNIFICANT CHANGE UP (ref 2–14)
NEUTROPHILS # BLD AUTO: 3.16 K/UL — SIGNIFICANT CHANGE UP (ref 1.8–7.4)
NEUTROPHILS NFR BLD AUTO: 42.2 % — LOW (ref 43–77)
NRBC # FLD: 0 — SIGNIFICANT CHANGE UP
NT-PROBNP SERPL-SCNC: 68.37 PG/ML — SIGNIFICANT CHANGE UP
PCO2 BLDV: 49 MMHG — SIGNIFICANT CHANGE UP (ref 41–51)
PH BLDV: 7.37 PH — SIGNIFICANT CHANGE UP (ref 7.32–7.43)
PLATELET # BLD AUTO: 160 K/UL — SIGNIFICANT CHANGE UP (ref 150–400)
PMV BLD: 11.8 FL — SIGNIFICANT CHANGE UP (ref 7–13)
PO2 BLDV: 34 MMHG — LOW (ref 35–40)
POTASSIUM BLDV-SCNC: 4.1 MMOL/L — SIGNIFICANT CHANGE UP (ref 3.4–4.5)
POTASSIUM SERPL-MCNC: 4.8 MMOL/L — SIGNIFICANT CHANGE UP (ref 3.5–5.3)
POTASSIUM SERPL-SCNC: 4.8 MMOL/L — SIGNIFICANT CHANGE UP (ref 3.5–5.3)
PROT SERPL-MCNC: 8.1 G/DL — SIGNIFICANT CHANGE UP (ref 6–8.3)
PROTHROM AB SERPL-ACNC: 11.1 SEC — SIGNIFICANT CHANGE UP (ref 9.8–13.1)
RBC # BLD: 4.83 M/UL — SIGNIFICANT CHANGE UP (ref 4.2–5.8)
RBC # FLD: 14.1 % — SIGNIFICANT CHANGE UP (ref 10.3–14.5)
SAO2 % BLDV: 59.5 % — LOW (ref 60–85)
SODIUM SERPL-SCNC: 134 MMOL/L — LOW (ref 135–145)
TROPONIN T SERPL-MCNC: < 0.06 NG/ML — SIGNIFICANT CHANGE UP (ref 0–0.06)
WBC # BLD: 7.49 K/UL — SIGNIFICANT CHANGE UP (ref 3.8–10.5)
WBC # FLD AUTO: 7.49 K/UL — SIGNIFICANT CHANGE UP (ref 3.8–10.5)

## 2017-07-01 PROCEDURE — 71020: CPT | Mod: 26

## 2017-07-01 PROCEDURE — 76705 ECHO EXAM OF ABDOMEN: CPT | Mod: 26

## 2017-07-01 RX ORDER — NITROGLYCERIN 6.5 MG
0.4 CAPSULE, EXTENDED RELEASE ORAL ONCE
Qty: 0 | Refills: 0 | Status: COMPLETED | OUTPATIENT
Start: 2017-07-01 | End: 2017-07-01

## 2017-07-01 RX ADMIN — Medication 0.4 MILLIGRAM(S): at 22:24

## 2017-07-01 NOTE — ED ADULT TRIAGE NOTE - CHIEF COMPLAINT QUOTE
Pt st" I have chest pain since friday and had fever last night." Denies nausea, denies short of breath, Denies Med hx.

## 2017-07-01 NOTE — ED PROVIDER NOTE - MEDICAL DECISION MAKING DETAILS
66 YOM DM HTN HLD p/w SS sticking chest pain since last night. HTN.  VS otherwise WNL.  +Jordan's US, metabolic and hematologic evaluation.  Analgesic medication.  Pt unable to take aspirin.  R/O ACS.  Treat symptomatically. Admit to house cardiology PT of Dr. Vance.

## 2017-07-01 NOTE — ED PROVIDER NOTE - ATTENDING CONTRIBUTION TO CARE
HUMA Attending Note - Dr. Alvarado  66 YOM DM HTN HLD p/w SS sticking chest pain since last night at the right lower anterior chest..  Pain episodic lasting 1-3 hours.  Associated RUQ pain.   PE: pt is alert and oriented, perrl, ent normal, membranes are moist, neck supple. no lymphadenopathy or thyroid enlargement, No JVD.  Chest clear to P&A, Heart- reg rhythm without murmur, rubs or gallops, radial pulses equal bilaterally.  Abd is soft, with RUQ abdominal tenderness. Bowel sounds are active. no mass or organomegaly. : No CVA tenderness. Neuro:  Pt alert and oriented x 3. Perrl    Distal neurosensory is intact. Motor function is 5/5 strength bilaterally.  No focal deficits. Extremities:  No edema.  Skin: warm and dry.  Imp: Chest pain R/O Biliary Colic vs ACS  Plan: Sonogram, labs, CXR, serial EKG's, and Cardiac enzymes .

## 2017-07-01 NOTE — ED ADULT NURSE NOTE - CHPI ED SYMPTOMS NEG
no dizziness/no nausea/no shortness of breath/no chills/no vomiting/no diaphoresis/no syncope/no back pain/no fever/no cough

## 2017-07-01 NOTE — ED ADULT NURSE NOTE - OBJECTIVE STATEMENT
66 years old male presents with complaints of right sided chest pain radiating to right shoulder. On arrival, patient is alert and oriented x 4, lungs are clear bilaterally, normal sinus rhythm on cardiac monitor, no edema noted. 20 gauge saline lock inserted, blood drawn and sent. Will follow up and monitor.

## 2017-07-01 NOTE — ED PROVIDER NOTE - OBJECTIVE STATEMENT
65 yo M pmhx of DM HTN HLD 66 YOM DM HTN HLD p/w SS sticking chest pain since last night.  Pain episodic lasting 1-3 hours.  Associated RUQ pain.  No  nausea, vomiting, bilious emesis, hematemesis, melena, hematochezia, bloody bowel movements, constipation, diarrhea, or rectal pain.   No associated with food intake.   Pt allergic to Motrin states he has never taken aspirin.

## 2017-07-02 DIAGNOSIS — R07.9 CHEST PAIN, UNSPECIFIED: ICD-10-CM

## 2017-07-02 DIAGNOSIS — R10.10 UPPER ABDOMINAL PAIN, UNSPECIFIED: ICD-10-CM

## 2017-07-02 DIAGNOSIS — R19.5 OTHER FECAL ABNORMALITIES: ICD-10-CM

## 2017-07-02 DIAGNOSIS — R07.89 OTHER CHEST PAIN: ICD-10-CM

## 2017-07-02 DIAGNOSIS — Z29.9 ENCOUNTER FOR PROPHYLACTIC MEASURES, UNSPECIFIED: ICD-10-CM

## 2017-07-02 DIAGNOSIS — E11.65 TYPE 2 DIABETES MELLITUS WITH HYPERGLYCEMIA: ICD-10-CM

## 2017-07-02 DIAGNOSIS — E78.5 HYPERLIPIDEMIA, UNSPECIFIED: ICD-10-CM

## 2017-07-02 DIAGNOSIS — J45.909 UNSPECIFIED ASTHMA, UNCOMPLICATED: ICD-10-CM

## 2017-07-02 DIAGNOSIS — I20.9 ANGINA PECTORIS, UNSPECIFIED: ICD-10-CM

## 2017-07-02 DIAGNOSIS — R10.9 UNSPECIFIED ABDOMINAL PAIN: ICD-10-CM

## 2017-07-02 LAB
AMYLASE P1 CFR SERPL: 57 U/L — SIGNIFICANT CHANGE UP (ref 25–125)
BUN SERPL-MCNC: 16 MG/DL — SIGNIFICANT CHANGE UP (ref 7–23)
CALCIUM SERPL-MCNC: 9.6 MG/DL — SIGNIFICANT CHANGE UP (ref 8.4–10.5)
CHLORIDE SERPL-SCNC: 96 MMOL/L — LOW (ref 98–107)
CHOLEST SERPL-MCNC: 198 MG/DL — SIGNIFICANT CHANGE UP (ref 120–199)
CK MB BLD-MCNC: 1.56 NG/ML — SIGNIFICANT CHANGE UP (ref 1–6.6)
CK MB BLD-MCNC: SIGNIFICANT CHANGE UP (ref 0–2.5)
CK SERPL-CCNC: 65 U/L — SIGNIFICANT CHANGE UP (ref 30–200)
CO2 SERPL-SCNC: 22 MMOL/L — SIGNIFICANT CHANGE UP (ref 22–31)
CREAT SERPL-MCNC: 0.89 MG/DL — SIGNIFICANT CHANGE UP (ref 0.5–1.3)
GLUCOSE SERPL-MCNC: 271 MG/DL — HIGH (ref 70–99)
HBA1C BLD-MCNC: 12.1 % — HIGH (ref 4–5.6)
HCT VFR BLD CALC: 36.7 % — LOW (ref 39–50)
HDLC SERPL-MCNC: 38 MG/DL — SIGNIFICANT CHANGE UP (ref 35–55)
HGB BLD-MCNC: 12.1 G/DL — LOW (ref 13–17)
LIDOCAIN IGE QN: 67.8 U/L — HIGH (ref 7–60)
LIPID PNL WITH DIRECT LDL SERPL: 127 MG/DL — SIGNIFICANT CHANGE UP
MCHC RBC-ENTMCNC: 26.8 PG — LOW (ref 27–34)
MCHC RBC-ENTMCNC: 33 % — SIGNIFICANT CHANGE UP (ref 32–36)
MCV RBC AUTO: 81.4 FL — SIGNIFICANT CHANGE UP (ref 80–100)
NRBC # FLD: 0 — SIGNIFICANT CHANGE UP
PLATELET # BLD AUTO: 132 K/UL — LOW (ref 150–400)
PMV BLD: 11.4 FL — SIGNIFICANT CHANGE UP (ref 7–13)
POTASSIUM SERPL-MCNC: 4.9 MMOL/L — SIGNIFICANT CHANGE UP (ref 3.5–5.3)
POTASSIUM SERPL-SCNC: 4.9 MMOL/L — SIGNIFICANT CHANGE UP (ref 3.5–5.3)
RBC # BLD: 4.51 M/UL — SIGNIFICANT CHANGE UP (ref 4.2–5.8)
RBC # FLD: 14.3 % — SIGNIFICANT CHANGE UP (ref 10.3–14.5)
SODIUM SERPL-SCNC: 134 MMOL/L — LOW (ref 135–145)
TRIGL SERPL-MCNC: 220 MG/DL — HIGH (ref 10–149)
TROPONIN T SERPL-MCNC: < 0.06 NG/ML — SIGNIFICANT CHANGE UP (ref 0–0.06)
WBC # BLD: 6.32 K/UL — SIGNIFICANT CHANGE UP (ref 3.8–10.5)
WBC # FLD AUTO: 6.32 K/UL — SIGNIFICANT CHANGE UP (ref 3.8–10.5)

## 2017-07-02 PROCEDURE — 99223 1ST HOSP IP/OBS HIGH 75: CPT

## 2017-07-02 RX ORDER — DEXTROSE 50 % IN WATER 50 %
25 SYRINGE (ML) INTRAVENOUS ONCE
Qty: 0 | Refills: 0 | Status: DISCONTINUED | OUTPATIENT
Start: 2017-07-02 | End: 2017-07-07

## 2017-07-02 RX ORDER — OMEGA-3 ACID ETHYL ESTERS 1 G
1 CAPSULE ORAL
Qty: 0 | Refills: 0 | COMMUNITY

## 2017-07-02 RX ORDER — GABAPENTIN 400 MG/1
400 CAPSULE ORAL EVERY 8 HOURS
Qty: 0 | Refills: 0 | Status: DISCONTINUED | OUTPATIENT
Start: 2017-07-02 | End: 2017-07-07

## 2017-07-02 RX ORDER — MONTELUKAST 4 MG/1
1 TABLET, CHEWABLE ORAL
Qty: 0 | Refills: 0 | COMMUNITY

## 2017-07-02 RX ORDER — METFORMIN HYDROCHLORIDE 850 MG/1
1 TABLET ORAL
Qty: 0 | Refills: 0 | COMMUNITY

## 2017-07-02 RX ORDER — INSULIN LISPRO 100/ML
6 VIAL (ML) SUBCUTANEOUS
Qty: 0 | Refills: 0 | Status: DISCONTINUED | OUTPATIENT
Start: 2017-07-02 | End: 2017-07-04

## 2017-07-02 RX ORDER — GABAPENTIN 400 MG/1
1 CAPSULE ORAL
Qty: 0 | Refills: 0 | COMMUNITY

## 2017-07-02 RX ORDER — OMEGA-3 ACID ETHYL ESTERS 1 G
2 CAPSULE ORAL
Qty: 0 | Refills: 0 | Status: DISCONTINUED | OUTPATIENT
Start: 2017-07-02 | End: 2017-07-07

## 2017-07-02 RX ORDER — ENOXAPARIN SODIUM 100 MG/ML
40 INJECTION SUBCUTANEOUS EVERY 24 HOURS
Qty: 0 | Refills: 0 | Status: DISCONTINUED | OUTPATIENT
Start: 2017-07-02 | End: 2017-07-07

## 2017-07-02 RX ORDER — INSULIN GLARGINE 100 [IU]/ML
15 INJECTION, SOLUTION SUBCUTANEOUS
Qty: 0 | Refills: 0 | Status: DISCONTINUED | OUTPATIENT
Start: 2017-07-02 | End: 2017-07-04

## 2017-07-02 RX ORDER — INSULIN LISPRO 100/ML
VIAL (ML) SUBCUTANEOUS AT BEDTIME
Qty: 0 | Refills: 0 | Status: DISCONTINUED | OUTPATIENT
Start: 2017-07-02 | End: 2017-07-07

## 2017-07-02 RX ORDER — SIMVASTATIN 20 MG/1
1 TABLET, FILM COATED ORAL
Qty: 0 | Refills: 0 | COMMUNITY

## 2017-07-02 RX ORDER — DEXTROSE 50 % IN WATER 50 %
1 SYRINGE (ML) INTRAVENOUS ONCE
Qty: 0 | Refills: 0 | Status: DISCONTINUED | OUTPATIENT
Start: 2017-07-02 | End: 2017-07-07

## 2017-07-02 RX ORDER — GLUCAGON INJECTION, SOLUTION 0.5 MG/.1ML
1 INJECTION, SOLUTION SUBCUTANEOUS ONCE
Qty: 0 | Refills: 0 | Status: DISCONTINUED | OUTPATIENT
Start: 2017-07-02 | End: 2017-07-07

## 2017-07-02 RX ORDER — MONTELUKAST 4 MG/1
10 TABLET, CHEWABLE ORAL DAILY
Qty: 0 | Refills: 0 | Status: DISCONTINUED | OUTPATIENT
Start: 2017-07-02 | End: 2017-07-07

## 2017-07-02 RX ORDER — ASPIRIN/CALCIUM CARB/MAGNESIUM 324 MG
1 TABLET ORAL
Qty: 0 | Refills: 0 | COMMUNITY

## 2017-07-02 RX ORDER — SIMVASTATIN 20 MG/1
20 TABLET, FILM COATED ORAL AT BEDTIME
Qty: 0 | Refills: 0 | Status: DISCONTINUED | OUTPATIENT
Start: 2017-07-02 | End: 2017-07-07

## 2017-07-02 RX ORDER — PANTOPRAZOLE SODIUM 20 MG/1
40 TABLET, DELAYED RELEASE ORAL
Qty: 0 | Refills: 0 | Status: DISCONTINUED | OUTPATIENT
Start: 2017-07-02 | End: 2017-07-07

## 2017-07-02 RX ORDER — FENOFIBRATE,MICRONIZED 130 MG
145 CAPSULE ORAL DAILY
Qty: 0 | Refills: 0 | Status: DISCONTINUED | OUTPATIENT
Start: 2017-07-02 | End: 2017-07-07

## 2017-07-02 RX ORDER — ASPIRIN/CALCIUM CARB/MAGNESIUM 324 MG
81 TABLET ORAL DAILY
Qty: 0 | Refills: 0 | Status: DISCONTINUED | OUTPATIENT
Start: 2017-07-02 | End: 2017-07-07

## 2017-07-02 RX ORDER — DEXTROSE 50 % IN WATER 50 %
12.5 SYRINGE (ML) INTRAVENOUS ONCE
Qty: 0 | Refills: 0 | Status: DISCONTINUED | OUTPATIENT
Start: 2017-07-02 | End: 2017-07-07

## 2017-07-02 RX ORDER — PREGABALIN 225 MG/1
1 CAPSULE ORAL
Qty: 0 | Refills: 0 | COMMUNITY

## 2017-07-02 RX ORDER — PREGABALIN 225 MG/1
500 CAPSULE ORAL DAILY
Qty: 0 | Refills: 0 | Status: DISCONTINUED | OUTPATIENT
Start: 2017-07-02 | End: 2017-07-07

## 2017-07-02 RX ORDER — INSULIN LISPRO 100/ML
VIAL (ML) SUBCUTANEOUS
Qty: 0 | Refills: 0 | Status: DISCONTINUED | OUTPATIENT
Start: 2017-07-02 | End: 2017-07-07

## 2017-07-02 RX ADMIN — GABAPENTIN 400 MILLIGRAM(S): 400 CAPSULE ORAL at 22:03

## 2017-07-02 RX ADMIN — SIMVASTATIN 20 MILLIGRAM(S): 20 TABLET, FILM COATED ORAL at 22:03

## 2017-07-02 RX ADMIN — INSULIN GLARGINE 15 UNIT(S): 100 INJECTION, SOLUTION SUBCUTANEOUS at 22:03

## 2017-07-02 RX ADMIN — PREGABALIN 500 MICROGRAM(S): 225 CAPSULE ORAL at 11:38

## 2017-07-02 RX ADMIN — Medication 6 UNIT(S): at 11:37

## 2017-07-02 RX ADMIN — Medication 5: at 11:37

## 2017-07-02 RX ADMIN — ENOXAPARIN SODIUM 40 MILLIGRAM(S): 100 INJECTION SUBCUTANEOUS at 11:38

## 2017-07-02 RX ADMIN — Medication 145 MILLIGRAM(S): at 11:36

## 2017-07-02 RX ADMIN — Medication 81 MILLIGRAM(S): at 11:36

## 2017-07-02 RX ADMIN — GABAPENTIN 400 MILLIGRAM(S): 400 CAPSULE ORAL at 14:24

## 2017-07-02 RX ADMIN — MONTELUKAST 10 MILLIGRAM(S): 4 TABLET, CHEWABLE ORAL at 11:36

## 2017-07-02 RX ADMIN — Medication 6 UNIT(S): at 17:14

## 2017-07-02 RX ADMIN — Medication 2 GRAM(S): at 17:13

## 2017-07-02 RX ADMIN — Medication 2: at 22:03

## 2017-07-02 RX ADMIN — Medication 3: at 17:14

## 2017-07-02 NOTE — H&P ADULT - HISTORY OF PRESENT ILLNESS
65 yo male PMHx of Uncontrolled DM, HLD, Asthma and Diabetic Neuropathy p/w Right sided chest pain radiating to R sided abdomen for past 3 days. Chest pain started on Friday, located on right side of the chest, 7/10, radiating to right side of abdomen, non-pleuritic, intermittent in nature. No alleviating factors noticed. Pt denies fever, chills, SOB, palpitations, diaphoresis, nausea, vomiting or diarrhea.

## 2017-07-02 NOTE — H&P ADULT - PROBLEM SELECTOR PLAN 1
tele monitor   cardiac enzymes x 2  Serial EKGs  DASH 1800 ADA diet   continue ASA, simvastatin, and fenofibrate

## 2017-07-02 NOTE — CONSULT NOTE ADULT - ASSESSMENT
65 yo male PMHx of Uncontrolled DM, HLD, Appendectomy and Diabetic Neuropathy p/w Right sided chest pain radiating to R sided abdomen for past 3 days. US Abd revealed No cholelithiasis or sonographic evidence of acute cholecystitis and Somewhat nodular contour of the liver, which may represent cirrhosis. LFTs were within normal limits on lab work and Lipase is mildly elevated. r/o biliary colic vs pancreatitis
This is a 66yoM p/w atypical chest and abdominal pain

## 2017-07-02 NOTE — CONSULT NOTE ADULT - PROBLEM SELECTOR RECOMMENDATION 2
-?dark stools intermittently over past few months  -cbc qd; hgb stable  -ppi qd  -check guaiac   -monitor stool color  -may need EGD inpatient vs outpatient

## 2017-07-02 NOTE — CONSULT NOTE ADULT - PROBLEM SELECTOR RECOMMENDATION 9
Patient states pain originates from RUQ and radiates to right upper chest; pain is reproducible and given patient's recent normal nuclear stress study chest pain is less likely a cardiology etiology  -trend CE

## 2017-07-02 NOTE — CONSULT NOTE ADULT - PROBLEM SELECTOR RECOMMENDATION 9
-US Abd reviewed with possible Cirrhosis  -daily labs  -simethicone q6h  -ppi qd  -hepatitis panel ordered  -liver serologies ordered  -CT Abd/Pelvis to assess abdominal pain to r/o colitis vs pancreatitis vs colick  -low fat diet -US Abd reviewed with possible Cirrhosis  -daily labs  -simethicone q6h  -ppi qd  -hepatitis panel ordered  -liver serologies ordered  -MRCP to assess abdominal pain to r/o biliary source  -low fat diet

## 2017-07-02 NOTE — ED ADULT NURSE REASSESSMENT NOTE - NS ED NURSE REASSESS COMMENT FT1
Patient received AA&Ox3 into spot 1A. VSS on RA. Patient denies chest pain, N/V, SOB, fever, chills at this time. NAD noted - will continue to monitor

## 2017-07-02 NOTE — H&P ADULT - ASSESSMENT
65 yo male PMHx of Uncontrolled DM, HLD, and Diabetic Neuropathy p/w Right sided chest pain radiating to R sided abdomen for past 3 days admitted to tele for atypical chest pain, r/o ACS.

## 2017-07-02 NOTE — H&P ADULT - RS GEN PE MLT RESP DETAILS PC
good air movement/respirations non-labored/chest wall tenderness/airway patent/breath sounds equal/clear to auscultation bilaterally

## 2017-07-02 NOTE — CONSULT NOTE ADULT - SUBJECTIVE AND OBJECTIVE BOX
Chief Complaint:  Patient is a 66y old  Male who presents with a chief complaint of chest pain (02 Jul 2017 08:41)    Asthma  HLD (hyperlipidemia)  DM (diabetes mellitus)  H/O elbow surgery  H/O appendectomy  History of coronary angiogram     HPI:  65 yo male PMHx of Uncontrolled DM, HLD, Asthma and Diabetic Neuropathy p/w Right sided chest pain radiating to R sided abdomen for past 3 days. Chest pain started on Friday, located on right side of the chest, 7/10, radiating to right side of abdomen, non-pleuritic, intermittent in nature with one episode of vomiting on Friday night which since resolved. No alleviating factors noticed and PO intake does not worsen or improve the discomfort. The patient reports that pain radiates from the middle of his right abdomen up into his right chest wall. Pt denies fever, chills, SOB, palpitations, diaphoresis, nausea, diarrhea, brbpr, dysphagia, odynophagia or weight changes. Admits to right sided abdominal pain and 'sometimes dark stools'. Last colonoscopy was about 1 year ago and was 'normal' and denied ever having an EGD.      Motrin (Stomach Upset)  No Known Allergies      aspirin enteric coated 81 milliGRAM(s) Oral daily  fenofibrate Tablet 145 milliGRAM(s) Oral daily  gabapentin 400 milliGRAM(s) Oral every 8 hours  montelukast 10 milliGRAM(s) Oral daily  omega-3-Acid Ethyl Esters 2 Gram(s) Oral two times a day  simvastatin 20 milliGRAM(s) Oral at bedtime  cyanocobalamin 500 MICROGram(s) Oral daily  enoxaparin Injectable 40 milliGRAM(s) SubCutaneous every 24 hours  insulin glargine Injectable (LANTUS) 15 Unit(s) SubCutaneous two times a day  insulin lispro Injectable (HumaLOG) 6 Unit(s) SubCutaneous three times a day before meals  insulin lispro (HumaLOG) corrective regimen sliding scale   SubCutaneous three times a day before meals  insulin lispro (HumaLOG) corrective regimen sliding scale   SubCutaneous at bedtime  dextrose Gel 1 Dose(s) Oral once PRN  dextrose 50% Injectable 12.5 Gram(s) IV Push once  dextrose 50% Injectable 25 Gram(s) IV Push once  dextrose 50% Injectable 25 Gram(s) IV Push once  glucagon  Injectable 1 milliGRAM(s) IntraMuscular once PRN        FAMILY HISTORY:        Review of Systems:    General:  No wt loss, fevers, chills, night sweats,fatigue,   Eyes:  Good vision, no reported pain  ENT:  No sore throat, pain, runny nose, dysphagia  CV:  No pain, palpitatioins, hypo/hypertension  Resp:  No dyspnea, cough, tachypnea, wheezing  GI: right sided abd pain, vomit x 1 on friday  :  No pain, bleeding, incontinence, nocturia  Muscle:  No pain, weakness  Neuro:  No weakness, tingling, memory problems  Psych:  No fatigue, insomnia, mood problems, depression  Endocrine:  No polyuria, polydypsia, cold/heat intolerance  Heme:  No petechiae, ecchymosis, easy bruisability  Skin:  No rash, tattoos, scars, edema    Relevant Family History:       Relevant Social History:       Physical Exam:    Vital Signs:  Vital Signs Last 24 Hrs  T(C): 36.8 (02 Jul 2017 09:55), Max: 36.8 (01 Jul 2017 20:11)  T(F): 98.3 (02 Jul 2017 09:55), Max: 98.3 (02 Jul 2017 09:55)  HR: 81 (02 Jul 2017 09:55) (63 - 81)  BP: 134/76 (02 Jul 2017 09:55) (129/57 - 210/77)  BP(mean): --  RR: 16 (02 Jul 2017 09:55) (16 - 21)  SpO2: 97% (02 Jul 2017 09:55) (97% - 200%)  Daily Height in cm: 167.64 (02 Jul 2017 08:41)    Daily     General:  Appears stated age, well-groomed, well-nourished, no distress  HEENT:  NC/AT,  conjunctivae clear and pink, no thyromegaly, nodules, adenopathy, no JVD  Chest:  Full & symmetric excursion, no increased effort, breath sounds clear  Cardiovascular:  Regular rhythm, S1, S2, no murmur/rub/S3/S4, no abdominal bruit, no edema  Abdomen:  Soft, +ttp right abd, mild, non-distended, normoactive bowel sounds,  no masses   Extremities:  no cyanosis,clubbing or edema  Skin:  No rash/erythema/ecchymoses/petechiae/wounds/abscess/warm/dry  Neuro/Psych:  Alert, oriented, no asterixis, no tremor, no encephalopathy    Laboratory:                            12.1   6.32  )-----------( 132      ( 02 Jul 2017 08:00 )             36.7     07-02    134<L>  |  96<L>  |  16  ----------------------------<  271<H>  4.9   |  22  |  0.89    Ca    9.6      02 Jul 2017 08:10    TPro  8.1  /  Alb  4.6  /  TBili  0.4  /  DBili  x   /  AST  29  /  ALT  32  /  AlkPhos  66  07-01    LIVER FUNCTIONS - ( 01 Jul 2017 21:01 )  Alb: 4.6 g/dL / Pro: 8.1 g/dL / ALK PHOS: 66 u/L / ALT: 32 u/L / AST: 29 u/L / GGT: x           PT/INR - ( 01 Jul 2017 21:20 )   PT: 11.1 SEC;   INR: 0.99          PTT - ( 01 Jul 2017 21:20 )  PTT:31.6 SEC    Amylase Serum57      Lipase serum67.8       Ammonia--    Imaging:      EXAM:  US ABDOMEN LIMITED        PROCEDURE DATE:  Jul 1 2017         INTERPRETATION:  CLINICAL INFORMATION: Right upper quadrant pain for 2   days. No pain medications given.    COMPARISON: Chest CT 5/24/2017.    TECHNIQUE: Sonography of the rightupper quadrant.     FINDINGS:    Liver: 16.1 cm. Somewhat nodular contour again noted.  Bile ducts: Normal caliber. Common bile duct measures 3 mm.   Gallbladder: No gallstone, significant gallbladder wall thickening or   pericholecystic fluid. Negative sonographic Jordan sign.  Pancreas: Unremarkable visualized proximal body. Poorly visualized   remainder due to bowel gas.  Right kidney: 11.3 x 5.2 x 4.4 cm. No hydronephrosis.  Ascites: None.  IVC: Visualized portions are within normal limits.    IMPRESSION:     No cholelithiasis or sonographic evidence of acute cholecystitis.    Somewhat nodular contour of the liver, which was noted on 5/24/2017 and   may be due to cirrhosis. Recommend clinical correlation.

## 2017-07-02 NOTE — CONSULT NOTE ADULT - SUBJECTIVE AND OBJECTIVE BOX
HISTORY OF PRESENT ILLNESS:  This is a 66yoM w/ PMHx HTN, HLD, DM p/w chest pain x 1 day     Allergies  No Known Allergies  Intolerances: Motrin (Stomach Upset)    MEDICATIONS:                  PAST MEDICAL & SURGICAL HISTORY:  HLD (hyperlipidemia)  DM (diabetes mellitus)  H/O elbow surgery  History of coronary angiogram      FAMILY HISTORY:  Family history of diabetes mellitus (Father, Mother)      SOCIAL HISTORY:    [ ] Non-smoker  [ ] Smoker  [ ] Alcohol      REVIEW OF SYSTEMS:  See HPI. Otherwise, 10 point ROS done and otherwise negative.    PHYSICAL EXAM:  T(C): 36.8 (07-01-17 @ 20:11), Max: 36.8 (07-01-17 @ 20:11)  HR: 63 (07-01-17 @ 21:16) (63 - 69)  BP: 196/54 (07-01-17 @ 21:16) (164/80 - 210/77)  RR: 20 (07-01-17 @ 21:16) (18 - 20)  SpO2: 200% (07-01-17 @ 21:16) (99% - 200%)  Wt(kg): --  I&O's Summary      Appearance: Normal	  HEENT:   Normal oral mucosa, PERRL, EOMI	  Lymphatic: No lymphadenopathy  Cardiovascular: Normal S1 S2, No JVD, No murmurs, No edema  Respiratory: Lungs clear to auscultation	  Psychiatry: A & O x 3, Mood & affect appropriate  Gastrointestinal:  Soft, Non-tender, + BS	  Skin: No rashes, No ecchymoses, No cyanosis	  Neurologic: Non-focal  Extremities: Normal range of motion, No clubbing, cyanosis or edema  Vascular: Peripheral pulses palpable 2+ bilaterally        LABS:	 	    CBC Full  -  ( 01 Jul 2017 21:01 )  WBC Count : 7.49 K/uL  Hemoglobin : 13.0 g/dL  Hematocrit : 40.0 %  Platelet Count - Automated : 160 K/uL  Mean Cell Volume : 82.8 fL  Mean Cell Hemoglobin : 26.9 pg  Mean Cell Hemoglobin Concentration : 32.5 %  Auto Neutrophil # : 3.16 K/uL  Auto Lymphocyte # : 3.50 K/uL  Auto Monocyte # : 0.69 K/uL  Auto Eosinophil # : 0.07 K/uL  Auto Basophil # : 0.05 K/uL  Auto Neutrophil % : 42.2 %  Auto Lymphocyte % : 46.7 %  Auto Monocyte % : 9.2 %  Auto Eosinophil % : 0.9 %  Auto Basophil % : 0.7 %    07-01    134<L>  |  93<L>  |  19  ----------------------------<  409<H>  4.8   |  27  |  1.03    Ca    10.5      01 Jul 2017 21:01    TPro  8.1  /  Alb  4.6  /  TBili  0.4  /  DBili  x   /  AST  29  /  ALT  32  /  AlkPhos  66  07-01      proBNP: Serum Pro-Brain Natriuretic Peptide: 68.37 pg/mL (07-01 @ 21:01)    Lipid Profile:   HgA1c:   TSH:       CARDIAC MARKERS:      CKMB: 2.10 ng/mL (07-01 @ 21:01) HISTORY OF PRESENT ILLNESS:  This is a 66yoM w/ PMHx HTN, HLD, DM p/w chest pain x 1 day     Allergies  No Known Allergies  Intolerances: Motrin (Stomach Upset)    MEDICATIONS:                  PAST MEDICAL & SURGICAL HISTORY:  HLD (hyperlipidemia)  DM (diabetes mellitus)  H/O elbow surgery  History of coronary angiogram      FAMILY HISTORY:  Family history of diabetes mellitus (Father, Mother)      SOCIAL HISTORY:    [ ] Non-smoker  [ ] Smoker  [ ] Alcohol      REVIEW OF SYSTEMS:  See HPI. Otherwise, 10 point ROS done and otherwise negative.    PHYSICAL EXAM:  T(C): 36.8 (07-01-17 @ 20:11), Max: 36.8 (07-01-17 @ 20:11)  HR: 63 (07-01-17 @ 21:16) (63 - 69)  BP: 196/54 (07-01-17 @ 21:16) (164/80 - 210/77)  RR: 20 (07-01-17 @ 21:16) (18 - 20)  SpO2: 200% (07-01-17 @ 21:16) (99% - 200%)  Wt(kg): --  I&O's Summary      Appearance: Normal	  HEENT:   Normal oral mucosa, PERRL, EOMI	  Lymphatic: No lymphadenopathy  Cardiovascular: Normal S1 S2, No JVD, No murmurs, No edema  Respiratory: Lungs clear to auscultation	  Psychiatry: A & O x 3, Mood & affect appropriate  Gastrointestinal:  Soft, Non-tender, + BS	  Skin: No rashes, No ecchymoses, No cyanosis	  Neurologic: Non-focal  Extremities: Normal range of motion, No clubbing, cyanosis or edema  Vascular: Peripheral pulses palpable 2+ bilaterally        LABS:	 	    CBC Full  -  ( 01 Jul 2017 21:01 )  WBC Count : 7.49 K/uL  Hemoglobin : 13.0 g/dL  Hematocrit : 40.0 %  Platelet Count - Automated : 160 K/uL  Mean Cell Volume : 82.8 fL  Mean Cell Hemoglobin : 26.9 pg  Mean Cell Hemoglobin Concentration : 32.5 %  Auto Neutrophil # : 3.16 K/uL  Auto Lymphocyte # : 3.50 K/uL  Auto Monocyte # : 0.69 K/uL  Auto Eosinophil # : 0.07 K/uL  Auto Basophil # : 0.05 K/uL  Auto Neutrophil % : 42.2 %  Auto Lymphocyte % : 46.7 %  Auto Monocyte % : 9.2 %  Auto Eosinophil % : 0.9 %  Auto Basophil % : 0.7 %    07-01    134<L>  |  93<L>  |  19  ----------------------------<  409<H>  4.8   |  27  |  1.03    Ca    10.5      01 Jul 2017 21:01    TPro  8.1  /  Alb  4.6  /  TBili  0.4  /  DBili  x   /  AST  29  /  ALT  32  /  AlkPhos  66  07-01      proBNP: Serum Pro-Brain Natriuretic Peptide: 68.37 pg/mL (07-01 @ 21:01)    CARDIAC MARKERS ( 01 Jul 2017 21:01 )  x     / < 0.06 ng/mL / 79 u/L / 2.10 ng/mL / x        Nuclear Stress test: 3/17  IMPRESSIONS:Normal Study  * Myocardial Perfusion SPECT results are normal.  * Normal myocardial perfusion scan,with no evidence of  infarction or inducible ischemia.  * Post-stress gated wall motion analysis was performed  (LVEF = 63 %;LVEDV = 67 ml.), revealing normal LV  function. RV size and function appeared normal. HISTORY OF PRESENT ILLNESS:  This is a 66yoM w/ PMHx HTN, HLD, DM p/w chest pain and abdominal pain x 2 days.  States pain started 2 days ago after ambulation, starts in the abdomen and radiates to the right upper chest.  Pain is reproducible with palpation and is not relieved with rest or OTC pain medications.  Denies recent illness, cough, n/v/d/fever    Allergies  No Known Allergies  Intolerances: Motrin (Stomach Upset)    MEDICATIONS:  Gabapentin 400mg PO TID  Naproxen 250mg PO daily  ASA 81mg PO daily  Omega-3-acid 1000mg PO BID  Singular 10mg PO daily  Ranitidine 150mg PO BID  Vitamin B PO BID  Metformin 1000mg PO BID  Vitamin B12 PO daily  Simvastatin 20mg PO daily  Fenofibrate 160mg PO daily  Metoprolol 50mg PO BID    PAST MEDICAL & SURGICAL HISTORY:  HLD (hyperlipidemia)  DM (diabetes mellitus)  H/O elbow surgery  History of coronary angiogram      FAMILY HISTORY:  Family history of diabetes mellitus (Father, Mother)      SOCIAL HISTORY:    Denies alcohol, cigarette or illicit drug use    REVIEW OF SYSTEMS:  See HPI. Otherwise, 10 point ROS done and otherwise negative.    PHYSICAL EXAM:  T(C): 36.8 (07-01-17 @ 20:11), Max: 36.8 (07-01-17 @ 20:11)  HR: 63 (07-01-17 @ 21:16) (63 - 69)  BP: 196/54 (07-01-17 @ 21:16) (164/80 - 210/77)  RR: 20 (07-01-17 @ 21:16) (18 - 20)  SpO2: 200% (07-01-17 @ 21:16) (99% - 200%)  Wt(kg): --  I&O's Summary      Appearance: Normal	  HEENT:   Normal oral mucosa, PERRL, EOMI	  Lymphatic: No lymphadenopathy  Cardiovascular: Normal S1 S2, No JVD, No murmurs, No edema  Respiratory: Lungs clear to auscultation	  Psychiatry: A & O x 3, Mood & affect appropriate  Gastrointestinal:  Soft, RUQ tenderness with deep palpation   Skin: No rashes, No ecchymoses, No cyanosis	  Neurologic: Non-focal  Extremities: Normal range of motion, No clubbing, cyanosis or edema  Vascular: Peripheral pulses palpable 2+ bilaterally      LABS:	 	  CBC Full  -  ( 01 Jul 2017 21:01 )  WBC Count : 7.49 K/uL  Hemoglobin : 13.0 g/dL  Hematocrit : 40.0 %  Platelet Count - Automated : 160 K/uL  Mean Cell Volume : 82.8 fL  Mean Cell Hemoglobin : 26.9 pg  Mean Cell Hemoglobin Concentration : 32.5 %  Auto Neutrophil # : 3.16 K/uL  Auto Lymphocyte # : 3.50 K/uL  Auto Monocyte # : 0.69 K/uL  Auto Eosinophil # : 0.07 K/uL  Auto Basophil # : 0.05 K/uL  Auto Neutrophil % : 42.2 %  Auto Lymphocyte % : 46.7 %  Auto Monocyte % : 9.2 %  Auto Eosinophil % : 0.9 %  Auto Basophil % : 0.7 %    07-01    134<L>  |  93<L>  |  19  ----------------------------<  409<H>  4.8   |  27  |  1.03    Ca    10.5      01 Jul 2017 21:01    TPro  8.1  /  Alb  4.6  /  TBili  0.4  /  DBili  x   /  AST  29  /  ALT  32  /  AlkPhos  66  07-01      proBNP: Serum Pro-Brain Natriuretic Peptide: 68.37 pg/mL (07-01 @ 21:01)    CARDIAC MARKERS ( 01 Jul 2017 21:01 )  x     / < 0.06 ng/mL / 79 u/L / 2.10 ng/mL / x        Nuclear Stress test: 3/17  IMPRESSIONS:Normal Study  * Myocardial Perfusion SPECT results are normal.  * Normal myocardial perfusion scan,with no evidence of  infarction or inducible ischemia.  * Post-stress gated wall motion analysis was performed  (LVEF = 63 %;LVEDV = 67 ml.), revealing normal LV  function. RV size and function appeared normal.    Limited Abd US: on admission  IMPRESSION:   No cholelithiasis or sonographic evidence of acute cholecystitis.  Somewhat nodular contour of the liver, which was noted on 5/24/2017 and   may be due to cirrhosis. Recommend clinical correlation.      EKG: NSR with frequent PVCs, no RAMESH

## 2017-07-02 NOTE — H&P ADULT - PROBLEM SELECTOR PLAN 2
US Abd: No cholelithiasis or sonographic evidence of acute cholecystitis.  Somewhat nodular contour of the liver, which was noted on 5/24/2017 and   may be due to cirrhosis. Recommend clinical correlation.  Consider GI consult

## 2017-07-02 NOTE — CONSULT NOTE ADULT - ATTENDING COMMENTS
There is point tenderness and reproducible pain right upper quadrant of the abdomen  Gastroenterology consult  Please add on amylase and lipase to his labs      Thanks,    Kamlesh Montilla

## 2017-07-02 NOTE — H&P ADULT - NSHPLABSRESULTS_GEN_ALL_CORE
EKG: NSR @ 86 bpm with Frequent PVC  Gene x1: neg  US Abd: No cholelithiasis or sonographic evidence of acute cholecystitis.  Somewhat nodular contour of the liver, which was noted on 5/24/2017 and   may be due to cirrhosis. Recommend clinical correlation.  probnp: 68.37

## 2017-07-03 LAB
AFP-TM SERPL-MCNC: 4.4 NG/ML — SIGNIFICANT CHANGE UP
BUN SERPL-MCNC: 22 MG/DL — SIGNIFICANT CHANGE UP (ref 7–23)
CALCIUM SERPL-MCNC: 10.1 MG/DL — SIGNIFICANT CHANGE UP (ref 8.4–10.5)
CHLORIDE SERPL-SCNC: 95 MMOL/L — LOW (ref 98–107)
CO2 SERPL-SCNC: 26 MMOL/L — SIGNIFICANT CHANGE UP (ref 22–31)
CREAT SERPL-MCNC: 0.95 MG/DL — SIGNIFICANT CHANGE UP (ref 0.5–1.3)
GLUCOSE SERPL-MCNC: 293 MG/DL — HIGH (ref 70–99)
HAV IGM SER-ACNC: NONREACTIVE — SIGNIFICANT CHANGE UP
HBV CORE IGM SER-ACNC: NONREACTIVE — SIGNIFICANT CHANGE UP
HBV SURFACE AG SER-ACNC: NONREACTIVE — SIGNIFICANT CHANGE UP
HCT VFR BLD CALC: 38.1 % — LOW (ref 39–50)
HCV AB S/CO SERPL IA: 0.13 S/CO — SIGNIFICANT CHANGE UP
HCV AB SERPL-IMP: SIGNIFICANT CHANGE UP
HGB BLD-MCNC: 12.4 G/DL — LOW (ref 13–17)
MCHC RBC-ENTMCNC: 27 PG — SIGNIFICANT CHANGE UP (ref 27–34)
MCHC RBC-ENTMCNC: 32.5 % — SIGNIFICANT CHANGE UP (ref 32–36)
MCV RBC AUTO: 82.8 FL — SIGNIFICANT CHANGE UP (ref 80–100)
NRBC # FLD: 0 — SIGNIFICANT CHANGE UP
PLATELET # BLD AUTO: 135 K/UL — LOW (ref 150–400)
PMV BLD: 12 FL — SIGNIFICANT CHANGE UP (ref 7–13)
POTASSIUM SERPL-MCNC: 4.8 MMOL/L — SIGNIFICANT CHANGE UP (ref 3.5–5.3)
POTASSIUM SERPL-SCNC: 4.8 MMOL/L — SIGNIFICANT CHANGE UP (ref 3.5–5.3)
RBC # BLD: 4.6 M/UL — SIGNIFICANT CHANGE UP (ref 4.2–5.8)
RBC # FLD: 14.3 % — SIGNIFICANT CHANGE UP (ref 10.3–14.5)
SODIUM SERPL-SCNC: 135 MMOL/L — SIGNIFICANT CHANGE UP (ref 135–145)
WBC # BLD: 4.95 K/UL — SIGNIFICANT CHANGE UP (ref 3.8–10.5)
WBC # FLD AUTO: 4.95 K/UL — SIGNIFICANT CHANGE UP (ref 3.8–10.5)

## 2017-07-03 RX ORDER — DOCUSATE SODIUM 100 MG
100 CAPSULE ORAL DAILY
Qty: 0 | Refills: 0 | Status: DISCONTINUED | OUTPATIENT
Start: 2017-07-03 | End: 2017-07-07

## 2017-07-03 RX ORDER — SENNA PLUS 8.6 MG/1
2 TABLET ORAL AT BEDTIME
Qty: 0 | Refills: 0 | Status: DISCONTINUED | OUTPATIENT
Start: 2017-07-03 | End: 2017-07-07

## 2017-07-03 RX ADMIN — Medication 6 UNIT(S): at 11:49

## 2017-07-03 RX ADMIN — SIMVASTATIN 20 MILLIGRAM(S): 20 TABLET, FILM COATED ORAL at 21:14

## 2017-07-03 RX ADMIN — GABAPENTIN 400 MILLIGRAM(S): 400 CAPSULE ORAL at 05:20

## 2017-07-03 RX ADMIN — INSULIN GLARGINE 15 UNIT(S): 100 INJECTION, SOLUTION SUBCUTANEOUS at 21:14

## 2017-07-03 RX ADMIN — PREGABALIN 500 MICROGRAM(S): 225 CAPSULE ORAL at 11:49

## 2017-07-03 RX ADMIN — GABAPENTIN 400 MILLIGRAM(S): 400 CAPSULE ORAL at 21:14

## 2017-07-03 RX ADMIN — Medication 6 UNIT(S): at 17:37

## 2017-07-03 RX ADMIN — Medication 3: at 08:32

## 2017-07-03 RX ADMIN — Medication 4: at 11:48

## 2017-07-03 RX ADMIN — Medication 145 MILLIGRAM(S): at 11:49

## 2017-07-03 RX ADMIN — PANTOPRAZOLE SODIUM 40 MILLIGRAM(S): 20 TABLET, DELAYED RELEASE ORAL at 05:20

## 2017-07-03 RX ADMIN — Medication 4: at 17:37

## 2017-07-03 RX ADMIN — Medication 2 GRAM(S): at 17:37

## 2017-07-03 RX ADMIN — Medication 2 GRAM(S): at 05:20

## 2017-07-03 RX ADMIN — Medication: at 21:18

## 2017-07-03 RX ADMIN — ENOXAPARIN SODIUM 40 MILLIGRAM(S): 100 INJECTION SUBCUTANEOUS at 11:49

## 2017-07-03 RX ADMIN — Medication 81 MILLIGRAM(S): at 11:49

## 2017-07-03 RX ADMIN — MONTELUKAST 10 MILLIGRAM(S): 4 TABLET, CHEWABLE ORAL at 11:51

## 2017-07-03 RX ADMIN — Medication 100 MILLIGRAM(S): at 21:14

## 2017-07-03 RX ADMIN — GABAPENTIN 400 MILLIGRAM(S): 400 CAPSULE ORAL at 14:14

## 2017-07-03 RX ADMIN — Medication 6 UNIT(S): at 08:33

## 2017-07-03 RX ADMIN — SENNA PLUS 2 TABLET(S): 8.6 TABLET ORAL at 21:14

## 2017-07-03 RX ADMIN — INSULIN GLARGINE 15 UNIT(S): 100 INJECTION, SOLUTION SUBCUTANEOUS at 08:32

## 2017-07-03 NOTE — PROGRESS NOTE ADULT - SUBJECTIVE AND OBJECTIVE BOX
Date of Admission:    24H hour events:     MEDICATIONS:  aspirin enteric coated 81 milliGRAM(s) Oral daily  enoxaparin Injectable 40 milliGRAM(s) SubCutaneous every 24 hours      montelukast 10 milliGRAM(s) Oral daily    gabapentin 400 milliGRAM(s) Oral every 8 hours    pantoprazole    Tablet 40 milliGRAM(s) Oral before breakfast    fenofibrate Tablet 145 milliGRAM(s) Oral daily  simvastatin 20 milliGRAM(s) Oral at bedtime  insulin glargine Injectable (LANTUS) 15 Unit(s) SubCutaneous two times a day  insulin lispro Injectable (HumaLOG) 6 Unit(s) SubCutaneous three times a day before meals  insulin lispro (HumaLOG) corrective regimen sliding scale   SubCutaneous three times a day before meals  insulin lispro (HumaLOG) corrective regimen sliding scale   SubCutaneous at bedtime  dextrose Gel 1 Dose(s) Oral once PRN  dextrose 50% Injectable 12.5 Gram(s) IV Push once  dextrose 50% Injectable 25 Gram(s) IV Push once  dextrose 50% Injectable 25 Gram(s) IV Push once  glucagon  Injectable 1 milliGRAM(s) IntraMuscular once PRN    cyanocobalamin 500 MICROGram(s) Oral daily      REVIEW OF SYSTEMS:  Complete 10point ROS negative.    PHYSICAL EXAM:  T(C): 36.4 (07-03-17 @ 05:16), Max: 36.8 (07-02-17 @ 21:55)  HR: 78 (07-03-17 @ 05:16) (73 - 80)  BP: 100/58 (07-03-17 @ 05:16) (100/58 - 120/79)  RR: 18 (07-03-17 @ 05:16) (16 - 18)  SpO2: 100% (07-03-17 @ 05:16) (99% - 100%)  Wt(kg): --  I&O's Summary    02 Jul 2017 07:01  -  03 Jul 2017 07:00  --------------------------------------------------------  IN: 614 mL / OUT: 0 mL / NET: 614 mL    03 Jul 2017 07:01  -  03 Jul 2017 10:10  --------------------------------------------------------  IN: 240 mL / OUT: 0 mL / NET: 240 mL        Appearance: Normal	  HEENT:   Normal oral mucosa, PERRL, EOMI	  Lymphatic: No lymphadenopathy  Cardiovascular: Normal S1 S2, No JVD, No murmurs, No edema  Respiratory: Lungs clear to auscultation	  Psychiatry: A & O x 3, Mood & affect appropriate  Gastrointestinal:  Soft, Non-tender, + BS	  Skin: No rashes, No ecchymoses, No cyanosis	  Neurologic: Non-focal  Extremities: Normal range of motion, No clubbing, cyanosis or edema  Vascular: Peripheral pulses palpable 2+ bilaterally        LABS:	 	    CBC Full  -  ( 03 Jul 2017 06:25 )  WBC Count : 4.95 K/uL  Hemoglobin : 12.4 g/dL  Hematocrit : 38.1 %  Platelet Count - Automated : 135 K/uL  Mean Cell Volume : 82.8 fL  Mean Cell Hemoglobin : 27.0 pg  Mean Cell Hemoglobin Concentration : 32.5 %  Auto Neutrophil # : x  Auto Lymphocyte # : x  Auto Monocyte # : x  Auto Eosinophil # : x  Auto Basophil # : x  Auto Neutrophil % : x  Auto Lymphocyte % : x  Auto Monocyte % : x  Auto Eosinophil % : x  Auto Basophil % : x    07-03    135  |  95<L>  |  22  ----------------------------<  293<H>  4.8   |  26  |  0.95  07-02    134<L>  |  96<L>  |  16  ----------------------------<  271<H>  4.9   |  22  |  0.89    Ca    10.1      03 Jul 2017 06:25  Ca    9.6      02 Jul 2017 08:10    TPro  8.1  /  Alb  4.6  /  TBili  0.4  /  DBili  x   /  AST  29  /  ALT  32  /  AlkPhos  66  07-01      proBNP: Serum Pro-Brain Natriuretic Peptide: 68.37 pg/mL (07-01 @ 21:01)    Lipid Profile:         CARDIAC MARKERS:            TELEMETRY: 	    ECG:  	  RADIOLOGY:  OTHER: 	    PREVIOUS DIAGNOSTIC TESTING:    [ ] Echocardiogram:  [ ]  Catheterization:  [ ] Stress Test:  	  	  ASSESSMENT/PLAN: 	    1. Non cardiac right sided chest and abdominal pain. Mild abnormalities of RUQ sono - pt denies EtOH use.   2. Cardiac enzymes unremarkable.     Please call medicine for transfer of service as this is not a cardiac issue but the patient continues to have symptoms. Ok to d/c tele.        Jaocb Grant MD, FACC  [id number]

## 2017-07-03 NOTE — PROGRESS NOTE ADULT - SUBJECTIVE AND OBJECTIVE BOX
INTERVAL HPI/OVERNIGHT EVENTS:    pt reports still with right sided abdominal pain and right chest wall tenderness  no n/v/d    MEDICATIONS  (STANDING):  aspirin enteric coated 81 milliGRAM(s) Oral daily  fenofibrate Tablet 145 milliGRAM(s) Oral daily  gabapentin 400 milliGRAM(s) Oral every 8 hours  montelukast 10 milliGRAM(s) Oral daily  omega-3-Acid Ethyl Esters 2 Gram(s) Oral two times a day  simvastatin 20 milliGRAM(s) Oral at bedtime  cyanocobalamin 500 MICROGram(s) Oral daily  enoxaparin Injectable 40 milliGRAM(s) SubCutaneous every 24 hours  insulin glargine Injectable (LANTUS) 15 Unit(s) SubCutaneous two times a day  insulin lispro Injectable (HumaLOG) 6 Unit(s) SubCutaneous three times a day before meals  insulin lispro (HumaLOG) corrective regimen sliding scale   SubCutaneous three times a day before meals  insulin lispro (HumaLOG) corrective regimen sliding scale   SubCutaneous at bedtime  dextrose 50% Injectable 12.5 Gram(s) IV Push once  dextrose 50% Injectable 25 Gram(s) IV Push once  dextrose 50% Injectable 25 Gram(s) IV Push once  pantoprazole    Tablet 40 milliGRAM(s) Oral before breakfast    MEDICATIONS  (PRN):  dextrose Gel 1 Dose(s) Oral once PRN Blood Glucose LESS THAN 70 milliGRAM(s)/deciliter  glucagon  Injectable 1 milliGRAM(s) IntraMuscular once PRN Glucose LESS THAN 70 milligrams/deciliter      Allergies    No Known Allergies    Intolerances    Motrin (Stomach Upset)      Review of Systems:    General:  No wt loss, fevers, chills, night sweats,fatigue,   Eyes:  Good vision, no reported pain  ENT:  No sore throat, pain, runny nose, dysphagia  CV:  No pain, palpitatioins, hypo/hypertension  Resp:  No dyspnea, cough, tachypnea, wheezing  GI:  +rt abd pain, No nausea, No vomiting, No diarrhea, No constipation No weight loss, No fever, No pruritis, No rectal bleeding, No tarry stools, No dysphagia,  :  No pain, bleeding, incontinence, nocturia  Muscle:  No pain, weakness  Neuro:  No weakness, tingling, memory problems  Psych:  No fatigue, insomnia, mood problems, depression  Endocrine:  No polyuria, polydypsia, cold/heat intolerance  Heme:  No petechiae, ecchymosis, easy bruisability  Skin:  No rash, tattoos, scars, edema      Vital Signs Last 24 Hrs  T(C): 36.4 (03 Jul 2017 05:16), Max: 36.8 (02 Jul 2017 21:55)  T(F): 97.6 (03 Jul 2017 05:16), Max: 98.3 (02 Jul 2017 21:55)  HR: 78 (03 Jul 2017 05:16) (73 - 80)  BP: 100/58 (03 Jul 2017 05:16) (100/58 - 120/79)  BP(mean): --  RR: 18 (03 Jul 2017 05:16) (16 - 18)  SpO2: 100% (03 Jul 2017 05:16) (99% - 100%)    PHYSICAL EXAM:    Constitutional: NAD, well-developed  HEENT: EOMI, throat clear  Neck: No LAD, supple  Respiratory: CTA and P  Cardiovascular: S1 and S2, RRR, no M  Gastrointestinal: BS+, soft, NT/ND, neg HSM, +ttp right mid abd  Extremities: No peripheral edema, neg clubing, cyanosis  Vascular: 2+ peripheral pulses  Neurological: A/O x 3, no focal deficits  Psychiatric: Normal mood, normal affect  Skin: No rashes      LABS:                        12.4   4.95  )-----------( 135      ( 03 Jul 2017 06:25 )             38.1     07-03    135  |  95<L>  |  22  ----------------------------<  293<H>  4.8   |  26  |  0.95    Ca    10.1      03 Jul 2017 06:25    TPro  8.1  /  Alb  4.6  /  TBili  0.4  /  DBili  x   /  AST  29  /  ALT  32  /  AlkPhos  66  07-01    PT/INR - ( 01 Jul 2017 21:20 )   PT: 11.1 SEC;   INR: 0.99          PTT - ( 01 Jul 2017 21:20 )  PTT:31.6 SEC      RADIOLOGY & ADDITIONAL TESTS:

## 2017-07-03 NOTE — PROGRESS NOTE ADULT - ASSESSMENT
67 yo male PMHx of Uncontrolled DM, HLD, Appendectomy and Diabetic Neuropathy p/w Right sided chest pain radiating to R sided abdomen for past 3 days. US Abd revealed No cholelithiasis or sonographic evidence of acute cholecystitis and Somewhat nodular contour of the liver, which may represent cirrhosis. LFTs were within normal limits on lab work and Lipase is mildly elevated. r/o biliary colic vs pancreatitis

## 2017-07-04 LAB
ANA PAT FLD IF-IMP: SIGNIFICANT CHANGE UP
ANA TITR SER: SIGNIFICANT CHANGE UP
BASOPHILS # BLD AUTO: 0.04 K/UL — SIGNIFICANT CHANGE UP (ref 0–0.2)
BASOPHILS NFR BLD AUTO: 0.8 % — SIGNIFICANT CHANGE UP (ref 0–2)
BUN SERPL-MCNC: 26 MG/DL — HIGH (ref 7–23)
CALCIUM SERPL-MCNC: 10.5 MG/DL — SIGNIFICANT CHANGE UP (ref 8.4–10.5)
CERULOPLASMIN SERPL-MCNC: 41 MG/DL — SIGNIFICANT CHANGE UP (ref 20–60)
CHLORIDE SERPL-SCNC: 99 MMOL/L — SIGNIFICANT CHANGE UP (ref 98–107)
CO2 SERPL-SCNC: 25 MMOL/L — SIGNIFICANT CHANGE UP (ref 22–31)
CREAT SERPL-MCNC: 1.03 MG/DL — SIGNIFICANT CHANGE UP (ref 0.5–1.3)
EOSINOPHIL # BLD AUTO: 0.07 K/UL — SIGNIFICANT CHANGE UP (ref 0–0.5)
EOSINOPHIL NFR BLD AUTO: 1.4 % — SIGNIFICANT CHANGE UP (ref 0–6)
GLUCOSE SERPL-MCNC: 354 MG/DL — HIGH (ref 70–99)
HCT VFR BLD CALC: 38.2 % — LOW (ref 39–50)
HGB BLD-MCNC: 12.4 G/DL — LOW (ref 13–17)
IMM GRANULOCYTES # BLD AUTO: 0 # — SIGNIFICANT CHANGE UP
IMM GRANULOCYTES NFR BLD AUTO: 0 % — SIGNIFICANT CHANGE UP (ref 0–1.5)
LYMPHOCYTES # BLD AUTO: 2.84 K/UL — SIGNIFICANT CHANGE UP (ref 1–3.3)
LYMPHOCYTES # BLD AUTO: 54.9 % — HIGH (ref 13–44)
MAGNESIUM SERPL-MCNC: 2 MG/DL — SIGNIFICANT CHANGE UP (ref 1.6–2.6)
MCHC RBC-ENTMCNC: 27.2 PG — SIGNIFICANT CHANGE UP (ref 27–34)
MCHC RBC-ENTMCNC: 32.5 % — SIGNIFICANT CHANGE UP (ref 32–36)
MCV RBC AUTO: 83.8 FL — SIGNIFICANT CHANGE UP (ref 80–100)
MONOCYTES # BLD AUTO: 0.5 K/UL — SIGNIFICANT CHANGE UP (ref 0–0.9)
MONOCYTES NFR BLD AUTO: 9.7 % — SIGNIFICANT CHANGE UP (ref 2–14)
NEUTROPHILS # BLD AUTO: 1.72 K/UL — LOW (ref 1.8–7.4)
NEUTROPHILS NFR BLD AUTO: 33.2 % — LOW (ref 43–77)
NRBC # FLD: 0 — SIGNIFICANT CHANGE UP
OB PNL STL: NEGATIVE — SIGNIFICANT CHANGE UP
PLATELET # BLD AUTO: 138 K/UL — LOW (ref 150–400)
PMV BLD: 12.3 FL — SIGNIFICANT CHANGE UP (ref 7–13)
POTASSIUM SERPL-MCNC: 5.2 MMOL/L — SIGNIFICANT CHANGE UP (ref 3.5–5.3)
POTASSIUM SERPL-SCNC: 5.2 MMOL/L — SIGNIFICANT CHANGE UP (ref 3.5–5.3)
RBC # BLD: 4.56 M/UL — SIGNIFICANT CHANGE UP (ref 4.2–5.8)
RBC # FLD: 14.5 % — SIGNIFICANT CHANGE UP (ref 10.3–14.5)
SODIUM SERPL-SCNC: 139 MMOL/L — SIGNIFICANT CHANGE UP (ref 135–145)
WBC # BLD: 5.17 K/UL — SIGNIFICANT CHANGE UP (ref 3.8–10.5)
WBC # FLD AUTO: 5.17 K/UL — SIGNIFICANT CHANGE UP (ref 3.8–10.5)

## 2017-07-04 PROCEDURE — 74183 MRI ABD W/O CNTR FLWD CNTR: CPT | Mod: 26

## 2017-07-04 PROCEDURE — 99233 SBSQ HOSP IP/OBS HIGH 50: CPT

## 2017-07-04 RX ORDER — INSULIN LISPRO 100/ML
10 VIAL (ML) SUBCUTANEOUS
Qty: 0 | Refills: 0 | Status: DISCONTINUED | OUTPATIENT
Start: 2017-07-04 | End: 2017-07-07

## 2017-07-04 RX ORDER — SODIUM CHLORIDE 9 MG/ML
1000 INJECTION INTRAMUSCULAR; INTRAVENOUS; SUBCUTANEOUS
Qty: 0 | Refills: 0 | Status: DISCONTINUED | OUTPATIENT
Start: 2017-07-04 | End: 2017-07-05

## 2017-07-04 RX ORDER — INSULIN GLARGINE 100 [IU]/ML
20 INJECTION, SOLUTION SUBCUTANEOUS
Qty: 0 | Refills: 0 | Status: DISCONTINUED | OUTPATIENT
Start: 2017-07-04 | End: 2017-07-07

## 2017-07-04 RX ADMIN — SENNA PLUS 2 TABLET(S): 8.6 TABLET ORAL at 21:35

## 2017-07-04 RX ADMIN — GABAPENTIN 400 MILLIGRAM(S): 400 CAPSULE ORAL at 05:21

## 2017-07-04 RX ADMIN — Medication 145 MILLIGRAM(S): at 11:31

## 2017-07-04 RX ADMIN — Medication 2 GRAM(S): at 17:19

## 2017-07-04 RX ADMIN — GABAPENTIN 400 MILLIGRAM(S): 400 CAPSULE ORAL at 14:43

## 2017-07-04 RX ADMIN — SODIUM CHLORIDE 100 MILLILITER(S): 9 INJECTION INTRAMUSCULAR; INTRAVENOUS; SUBCUTANEOUS at 21:36

## 2017-07-04 RX ADMIN — MONTELUKAST 10 MILLIGRAM(S): 4 TABLET, CHEWABLE ORAL at 11:31

## 2017-07-04 RX ADMIN — GABAPENTIN 400 MILLIGRAM(S): 400 CAPSULE ORAL at 21:35

## 2017-07-04 RX ADMIN — Medication 81 MILLIGRAM(S): at 11:32

## 2017-07-04 RX ADMIN — Medication 100 MILLIGRAM(S): at 11:32

## 2017-07-04 RX ADMIN — INSULIN GLARGINE 20 UNIT(S): 100 INJECTION, SOLUTION SUBCUTANEOUS at 21:35

## 2017-07-04 RX ADMIN — Medication 2 GRAM(S): at 05:21

## 2017-07-04 RX ADMIN — Medication 10 UNIT(S): at 17:20

## 2017-07-04 RX ADMIN — INSULIN GLARGINE 15 UNIT(S): 100 INJECTION, SOLUTION SUBCUTANEOUS at 08:30

## 2017-07-04 RX ADMIN — SODIUM CHLORIDE 100 MILLILITER(S): 9 INJECTION INTRAMUSCULAR; INTRAVENOUS; SUBCUTANEOUS at 10:43

## 2017-07-04 RX ADMIN — Medication 2: at 17:19

## 2017-07-04 RX ADMIN — Medication 10 UNIT(S): at 11:33

## 2017-07-04 RX ADMIN — PREGABALIN 500 MICROGRAM(S): 225 CAPSULE ORAL at 11:32

## 2017-07-04 RX ADMIN — SIMVASTATIN 20 MILLIGRAM(S): 20 TABLET, FILM COATED ORAL at 21:35

## 2017-07-04 RX ADMIN — Medication 4: at 11:32

## 2017-07-04 RX ADMIN — PANTOPRAZOLE SODIUM 40 MILLIGRAM(S): 20 TABLET, DELAYED RELEASE ORAL at 05:21

## 2017-07-04 RX ADMIN — Medication 6 UNIT(S): at 08:28

## 2017-07-04 RX ADMIN — Medication 4: at 08:28

## 2017-07-04 RX ADMIN — ENOXAPARIN SODIUM 40 MILLIGRAM(S): 100 INJECTION SUBCUTANEOUS at 11:33

## 2017-07-04 NOTE — PROGRESS NOTE ADULT - SUBJECTIVE AND OBJECTIVE BOX
Patient is a 66y old  Male who presents with a chief complaint of chest pain (02 Jul 2017 08:41)      SUBJECTIVE / OVERNIGHT EVENTS:  Patient c/o right sided abdominal pain, no epigastric/left sided abd pain. Denies chest pain or sob. Feels ok.    MEDICATIONS  (STANDING):  aspirin enteric coated 81 milliGRAM(s) Oral daily  fenofibrate Tablet 145 milliGRAM(s) Oral daily  gabapentin 400 milliGRAM(s) Oral every 8 hours  montelukast 10 milliGRAM(s) Oral daily  omega-3-Acid Ethyl Esters 2 Gram(s) Oral two times a day  simvastatin 20 milliGRAM(s) Oral at bedtime  cyanocobalamin 500 MICROGram(s) Oral daily  enoxaparin Injectable 40 milliGRAM(s) SubCutaneous every 24 hours  insulin glargine Injectable (LANTUS) 15 Unit(s) SubCutaneous two times a day  insulin lispro (HumaLOG) corrective regimen sliding scale   SubCutaneous three times a day before meals  insulin lispro (HumaLOG) corrective regimen sliding scale   SubCutaneous at bedtime  dextrose 50% Injectable 12.5 Gram(s) IV Push once  dextrose 50% Injectable 25 Gram(s) IV Push once  dextrose 50% Injectable 25 Gram(s) IV Push once  pantoprazole    Tablet 40 milliGRAM(s) Oral before breakfast  docusate sodium 100 milliGRAM(s) Oral daily  senna 2 Tablet(s) Oral at bedtime  insulin lispro Injectable (HumaLOG) 10 Unit(s) SubCutaneous three times a day before meals  sodium chloride 0.9%. 1000 milliLiter(s) (100 mL/Hr) IV Continuous <Continuous>    MEDICATIONS  (PRN):  dextrose Gel 1 Dose(s) Oral once PRN Blood Glucose LESS THAN 70 milliGRAM(s)/deciliter  glucagon  Injectable 1 milliGRAM(s) IntraMuscular once PRN Glucose LESS THAN 70 milligrams/deciliter      Vital Signs Last 24 Hrs  T(C): 36.3 (07-04-17 @ 05:19), Max: 36.8 (07-03-17 @ 14:52)  HR: 78 (07-04-17 @ 05:19) (78 - 83)  BP: 115/73 (07-04-17 @ 05:19) (102/64 - 115/73)  RR: 17 (07-04-17 @ 05:19) (17 - 18)  SpO2: 100% (07-04-17 @ 05:19) (100% - 100%)  CAPILLARY BLOOD GLUCOSE  315 (04 Jul 2017 08:18)  322 (03 Jul 2017 21:27)  309 (03 Jul 2017 17:40)  333 (03 Jul 2017 11:43)        I&O's Summary    03 Jul 2017 07:01  -  04 Jul 2017 07:00  --------------------------------------------------------  IN: 914 mL / OUT: 0 mL / NET: 914 mL    04 Jul 2017 07:01  -  04 Jul 2017 11:36  --------------------------------------------------------  IN: 280 mL / OUT: 0 mL / NET: 280 mL        PHYSICAL EXAM:  GENERAL: NAD, well-developed  HEAD:  Atraumatic, Normocephalic  EYES: EOMI, PERRLA, conjunctiva and sclera clear  NECK: Supple, No JVD  CHEST/LUNG: Clear to auscultation bilaterally; No wheeze  HEART: Regular rate and rhythm; No murmurs, rubs, or gallops  ABDOMEN: Soft, right sided abdominal tenderness,  Nondistended; Bowel sounds present  EXTREMITIES:  2+ Peripheral Pulses, No clubbing, cyanosis, or edema  PSYCH: AAOx3  NEUROLOGY: non-focal  SKIN: No rashes or lesions    LABS:                        12.4   5.17  )-----------( 138      ( 04 Jul 2017 05:35 )             38.2     07-04    139  |  99  |  26<H>  ----------------------------<  354<H>  5.2   |  25  |  1.03    Ca    10.5      04 Jul 2017 05:35  Mg     2.0     07-04                  Microbiology:     RADIOLOGY & ADDITIONAL TESTS:    Imaging Personally Reviewed:  < from: US Abdomen Limited (07.01.17 @ 23:12) >  IMPRESSION:     No cholelithiasis or sonographic evidence of acute cholecystitis.    Somewhat nodular contour of the liver, which was noted on 5/24/2017 and   may be due to cirrhosis. Recommend clinical correlation.    Consultant(s) Notes Reviewed:   GI    Care Discussed with Consultants/Other Providers:

## 2017-07-04 NOTE — PROGRESS NOTE ADULT - PROBLEM SELECTOR PLAN 3
Uncontrolled DM-2 with hyperglycemia, A1c 12.1  Increase lantus to 20 units bid and increase humalog to 10 units ac, c/w humalog ISS  Monitor FS closely. IVF NS x1 day

## 2017-07-04 NOTE — DIETITIAN INITIAL EVALUATION ADULT. - DIET TYPE
DASH/TLC (sodium and cholesterol restricted diet)/consistent carbohydrate (no snacks)/Low Sodium, No Caffeine

## 2017-07-04 NOTE — DIETITIAN INITIAL EVALUATION ADULT. - NS AS NUTRI INTERV ED CONTENT
Priority modifications/1) Continue current diet Consistent Carbohydrate, DASH/TLC (cholesterol and sodium restricted), Low Sodium, No Caffeine diet     2) Suggest outpatient follow-up with an endocrinologist and Registered Dietitian to ensure long-term diabetes diet comprehension and adherence./Purpose of the nutrition education

## 2017-07-04 NOTE — PROGRESS NOTE ADULT - ASSESSMENT
65 yo male PMHx of Uncontrolled DM, HLD, Appendectomy and Diabetic Neuropathy p/w Right sided chest pain radiating to R sided abdomen for past 3 days. US Abd revealed No cholelithiasis or sonographic evidence of acute cholecystitis and Somewhat nodular contour of the liver, which may represent cirrhosis. LFTs were within normal limits on lab work and Lipase is mildly elevated. r/o biliary colic vs pancreatitis

## 2017-07-04 NOTE — DIETITIAN INITIAL EVALUATION ADULT. - OTHER INFO
Nutrition consult received for HbA1C 12.1%; admitted for chest pain. Met with patient during lunch meal. Observed with ~75% of meal completed thus far. Denies food allergies, GI distress (nausea/vomiting/constipation/diarrhea), or issues with chewing/swallowing. Patient adheres to Halal dietary guidelines but does not eat chicken overall due to personal preference. Elevated HbA1C discussed, patient states Finger sticks are only high now that he is in the hospital. DM diagnosed 1 year ago. He checked PTA every morning and night, took oral meds and insulin; unable recall average range of home Finger stick readings. Patient reports significant weight loss over the past 1 month from # secondary to being sick. Consistent Carbohydrate diet and sources of carbohydrate reviewed, however patient appeared to demonstrate limited/fair knowledge of diet reinforced.

## 2017-07-04 NOTE — PROGRESS NOTE ADULT - SUBJECTIVE AND OBJECTIVE BOX
INTERVAL HPI/OVERNIGHT EVENTS: Pt s/e at bedside, no complaints, improved abd pain    MEDICATIONS  (STANDING):  aspirin enteric coated 81 milliGRAM(s) Oral daily  fenofibrate Tablet 145 milliGRAM(s) Oral daily  gabapentin 400 milliGRAM(s) Oral every 8 hours  montelukast 10 milliGRAM(s) Oral daily  omega-3-Acid Ethyl Esters 2 Gram(s) Oral two times a day  simvastatin 20 milliGRAM(s) Oral at bedtime  cyanocobalamin 500 MICROGram(s) Oral daily  enoxaparin Injectable 40 milliGRAM(s) SubCutaneous every 24 hours  insulin glargine Injectable (LANTUS) 15 Unit(s) SubCutaneous two times a day  insulin lispro (HumaLOG) corrective regimen sliding scale   SubCutaneous three times a day before meals  insulin lispro (HumaLOG) corrective regimen sliding scale   SubCutaneous at bedtime  dextrose 50% Injectable 12.5 Gram(s) IV Push once  dextrose 50% Injectable 25 Gram(s) IV Push once  dextrose 50% Injectable 25 Gram(s) IV Push once  pantoprazole    Tablet 40 milliGRAM(s) Oral before breakfast  docusate sodium 100 milliGRAM(s) Oral daily  senna 2 Tablet(s) Oral at bedtime  insulin lispro Injectable (HumaLOG) 10 Unit(s) SubCutaneous three times a day before meals  sodium chloride 0.9%. 1000 milliLiter(s) (100 mL/Hr) IV Continuous <Continuous>    MEDICATIONS  (PRN):  dextrose Gel 1 Dose(s) Oral once PRN Blood Glucose LESS THAN 70 milliGRAM(s)/deciliter  glucagon  Injectable 1 milliGRAM(s) IntraMuscular once PRN Glucose LESS THAN 70 milligrams/deciliter      Allergies    No Known Allergies    Intolerances    Motrin (Stomach Upset)      ROS:   General:  No wt loss, fevers, chills, night sweats, fatigue,   Eyes:  Good vision, no reported pain  ENT:  No sore throat, pain, runny nose, dysphagia  CV:  No pain, palpitations, hypo/hypertension  Resp:  No dyspnea, cough, tachypnea, wheezing  GI:  No pain, No nausea, No vomiting, No diarrhea, No constipation, No weight loss, No fever, No pruritis, No rectal bleeding, No tarry stools, No dysphagia,  :  No pain, bleeding, incontinence, nocturia  Muscle:  No pain, weakness  Neuro:  No weakness, tingling, memory problems  Psych:  No fatigue, insomnia, mood problems, depression  Endocrine:  No polyuria, polydipsia, cold/heat intolerance  Heme:  No petechiae, ecchymosis, easy bruisability  Skin:  No rash, tattoos, scars, edema      PHYSICAL EXAM:   Vital Signs:  Vital Signs Last 24 Hrs  T(C): 36.3 (2017 05:19), Max: 36.8 (2017 14:52)  T(F): 97.4 (2017 05:19), Max: 98.3 (2017 14:52)  HR: 78 (2017 05:19) (78 - 83)  BP: 115/73 (2017 05:19) (102/64 - 115/73)  BP(mean): --  RR: 17 (2017 05:19) (17 - 18)  SpO2: 100% (2017 05:19) (100% - 100%)  Daily     Daily Weight in k.1 (2017 07:44)    GENERAL:  Appears stated age, well-groomed, well-nourished, no distress  HEENT:  NC/AT,  conjunctivae clear and pink, no thyromegaly, nodules, adenopathy, no JVD, sclera -anicteric  CHEST:  Full & symmetric excursion, no increased effort, breath sounds clear  HEART:  Regular rhythm, S1, S2, no murmur/rub/S3/S4, no abdominal bruit, no edema  ABDOMEN:  Soft, non-tender, non-distended, normoactive bowel sounds,  no masses ,no hepato-splenomegaly, no signs of chronic liver disease  EXTEREMITIES:  no cyanosis,clubbing or edema  SKIN:  No rash/erythema/ecchymoses/petechiae/wounds/abscess/warm/dry  NEURO:  Alert, oriented, no asterixis, no tremor, no encephalopathy      LABS:                        12.4   5.17  )-----------( 138      ( 2017 05:35 )             38.2     07-04    139  |  99  |  26<H>  ----------------------------<  354<H>  5.2   |  25  |  1.03    Ca    10.5      2017 05:35  Mg     2.0     07-04            RADIOLOGY & ADDITIONAL TESTS:

## 2017-07-04 NOTE — DIETITIAN INITIAL EVALUATION ADULT. - PERTINENT LABORATORY DATA
Na+ 139 mmol/L, K+ 5.2 mmol/L, BUN 26 mg/dL<H>, Creatinine 1.03 mg/dL, Glucose 354 mg/dL<H>, HbA1C (7/2) 12.1%, Triglycerides 220H, Lipase 67.8H

## 2017-07-05 DIAGNOSIS — K85.90 ACUTE PANCREATITIS WITHOUT NECROSIS OR INFECTION, UNSPECIFIED: ICD-10-CM

## 2017-07-05 DIAGNOSIS — K74.60 UNSPECIFIED CIRRHOSIS OF LIVER: ICD-10-CM

## 2017-07-05 LAB
BASOPHILS # BLD AUTO: 0.04 K/UL — SIGNIFICANT CHANGE UP (ref 0–0.2)
BASOPHILS NFR BLD AUTO: 0.8 % — SIGNIFICANT CHANGE UP (ref 0–2)
BUN SERPL-MCNC: 25 MG/DL — HIGH (ref 7–23)
CALCIUM SERPL-MCNC: 10 MG/DL — SIGNIFICANT CHANGE UP (ref 8.4–10.5)
CHLORIDE SERPL-SCNC: 100 MMOL/L — SIGNIFICANT CHANGE UP (ref 98–107)
CO2 SERPL-SCNC: 24 MMOL/L — SIGNIFICANT CHANGE UP (ref 22–31)
CREAT SERPL-MCNC: 1 MG/DL — SIGNIFICANT CHANGE UP (ref 0.5–1.3)
EOSINOPHIL # BLD AUTO: 0.09 K/UL — SIGNIFICANT CHANGE UP (ref 0–0.5)
EOSINOPHIL NFR BLD AUTO: 1.8 % — SIGNIFICANT CHANGE UP (ref 0–6)
GLUCOSE SERPL-MCNC: 271 MG/DL — HIGH (ref 70–99)
HCT VFR BLD CALC: 38.1 % — LOW (ref 39–50)
HGB BLD-MCNC: 12.2 G/DL — LOW (ref 13–17)
IMM GRANULOCYTES # BLD AUTO: 0.01 # — SIGNIFICANT CHANGE UP
IMM GRANULOCYTES NFR BLD AUTO: 0.2 % — SIGNIFICANT CHANGE UP (ref 0–1.5)
LYMPHOCYTES # BLD AUTO: 2.87 K/UL — SIGNIFICANT CHANGE UP (ref 1–3.3)
LYMPHOCYTES # BLD AUTO: 55.8 % — HIGH (ref 13–44)
MAGNESIUM SERPL-MCNC: 1.9 MG/DL — SIGNIFICANT CHANGE UP (ref 1.6–2.6)
MCHC RBC-ENTMCNC: 26.6 PG — LOW (ref 27–34)
MCHC RBC-ENTMCNC: 32 % — SIGNIFICANT CHANGE UP (ref 32–36)
MCV RBC AUTO: 83.2 FL — SIGNIFICANT CHANGE UP (ref 80–100)
MITOCHONDRIA AB SER-ACNC: SIGNIFICANT CHANGE UP
MONOCYTES # BLD AUTO: 0.42 K/UL — SIGNIFICANT CHANGE UP (ref 0–0.9)
MONOCYTES NFR BLD AUTO: 8.2 % — SIGNIFICANT CHANGE UP (ref 2–14)
NEUTROPHILS # BLD AUTO: 1.71 K/UL — LOW (ref 1.8–7.4)
NEUTROPHILS NFR BLD AUTO: 33.2 % — LOW (ref 43–77)
NRBC # FLD: 0 — SIGNIFICANT CHANGE UP
PLATELET # BLD AUTO: 153 K/UL — SIGNIFICANT CHANGE UP (ref 150–400)
PMV BLD: 12.2 FL — SIGNIFICANT CHANGE UP (ref 7–13)
POTASSIUM SERPL-MCNC: 4.4 MMOL/L — SIGNIFICANT CHANGE UP (ref 3.5–5.3)
POTASSIUM SERPL-SCNC: 4.4 MMOL/L — SIGNIFICANT CHANGE UP (ref 3.5–5.3)
RBC # BLD: 4.58 M/UL — SIGNIFICANT CHANGE UP (ref 4.2–5.8)
RBC # FLD: 14.6 % — HIGH (ref 10.3–14.5)
SODIUM SERPL-SCNC: 138 MMOL/L — SIGNIFICANT CHANGE UP (ref 135–145)
WBC # BLD: 5.14 K/UL — SIGNIFICANT CHANGE UP (ref 3.8–10.5)
WBC # FLD AUTO: 5.14 K/UL — SIGNIFICANT CHANGE UP (ref 3.8–10.5)

## 2017-07-05 PROCEDURE — 99233 SBSQ HOSP IP/OBS HIGH 50: CPT

## 2017-07-05 RX ORDER — TRAMADOL HYDROCHLORIDE 50 MG/1
50 TABLET ORAL EVERY 8 HOURS
Qty: 0 | Refills: 0 | Status: DISCONTINUED | OUTPATIENT
Start: 2017-07-05 | End: 2017-07-06

## 2017-07-05 RX ORDER — PANTOPRAZOLE SODIUM 20 MG/1
1 TABLET, DELAYED RELEASE ORAL
Qty: 0 | Refills: 0 | COMMUNITY
Start: 2017-07-05

## 2017-07-05 RX ORDER — SODIUM CHLORIDE 9 MG/ML
1000 INJECTION INTRAMUSCULAR; INTRAVENOUS; SUBCUTANEOUS
Qty: 0 | Refills: 0 | Status: DISCONTINUED | OUTPATIENT
Start: 2017-07-05 | End: 2017-07-07

## 2017-07-05 RX ADMIN — Medication 2: at 08:51

## 2017-07-05 RX ADMIN — TRAMADOL HYDROCHLORIDE 50 MILLIGRAM(S): 50 TABLET ORAL at 22:50

## 2017-07-05 RX ADMIN — GABAPENTIN 400 MILLIGRAM(S): 400 CAPSULE ORAL at 21:58

## 2017-07-05 RX ADMIN — INSULIN GLARGINE 20 UNIT(S): 100 INJECTION, SOLUTION SUBCUTANEOUS at 21:58

## 2017-07-05 RX ADMIN — TRAMADOL HYDROCHLORIDE 50 MILLIGRAM(S): 50 TABLET ORAL at 17:40

## 2017-07-05 RX ADMIN — TRAMADOL HYDROCHLORIDE 50 MILLIGRAM(S): 50 TABLET ORAL at 16:40

## 2017-07-05 RX ADMIN — PREGABALIN 500 MICROGRAM(S): 225 CAPSULE ORAL at 11:24

## 2017-07-05 RX ADMIN — TRAMADOL HYDROCHLORIDE 50 MILLIGRAM(S): 50 TABLET ORAL at 21:55

## 2017-07-05 RX ADMIN — Medication 10 UNIT(S): at 08:51

## 2017-07-05 RX ADMIN — MONTELUKAST 10 MILLIGRAM(S): 4 TABLET, CHEWABLE ORAL at 11:24

## 2017-07-05 RX ADMIN — SIMVASTATIN 20 MILLIGRAM(S): 20 TABLET, FILM COATED ORAL at 21:59

## 2017-07-05 RX ADMIN — GABAPENTIN 400 MILLIGRAM(S): 400 CAPSULE ORAL at 05:33

## 2017-07-05 RX ADMIN — Medication 3: at 11:25

## 2017-07-05 RX ADMIN — Medication 81 MILLIGRAM(S): at 11:24

## 2017-07-05 RX ADMIN — Medication 10 UNIT(S): at 11:25

## 2017-07-05 RX ADMIN — Medication 2 GRAM(S): at 17:28

## 2017-07-05 RX ADMIN — Medication 1: at 22:05

## 2017-07-05 RX ADMIN — SODIUM CHLORIDE 100 MILLILITER(S): 9 INJECTION INTRAMUSCULAR; INTRAVENOUS; SUBCUTANEOUS at 16:40

## 2017-07-05 RX ADMIN — GABAPENTIN 400 MILLIGRAM(S): 400 CAPSULE ORAL at 14:05

## 2017-07-05 RX ADMIN — Medication 2 GRAM(S): at 05:33

## 2017-07-05 RX ADMIN — Medication 3: at 17:27

## 2017-07-05 RX ADMIN — Medication 10 UNIT(S): at 17:27

## 2017-07-05 RX ADMIN — INSULIN GLARGINE 20 UNIT(S): 100 INJECTION, SOLUTION SUBCUTANEOUS at 08:51

## 2017-07-05 RX ADMIN — SENNA PLUS 2 TABLET(S): 8.6 TABLET ORAL at 21:55

## 2017-07-05 RX ADMIN — ENOXAPARIN SODIUM 40 MILLIGRAM(S): 100 INJECTION SUBCUTANEOUS at 11:27

## 2017-07-05 RX ADMIN — PANTOPRAZOLE SODIUM 40 MILLIGRAM(S): 20 TABLET, DELAYED RELEASE ORAL at 05:33

## 2017-07-05 RX ADMIN — SODIUM CHLORIDE 100 MILLILITER(S): 9 INJECTION INTRAMUSCULAR; INTRAVENOUS; SUBCUTANEOUS at 05:33

## 2017-07-05 RX ADMIN — Medication 100 MILLIGRAM(S): at 11:25

## 2017-07-05 RX ADMIN — Medication 145 MILLIGRAM(S): at 11:24

## 2017-07-05 NOTE — DISCHARGE NOTE ADULT - PATIENT PORTAL LINK FT
“You can access the [facility] Patient Portal, offered by [facility], by registering with the following website: [email or web address]”

## 2017-07-05 NOTE — PROGRESS NOTE ADULT - PROBLEM SELECTOR PLAN 3
ruled out for MI with negative cardiac enzymes, no further cardiac workup as per cardiology  continue ASA, statin

## 2017-07-05 NOTE — DISCHARGE NOTE ADULT - MEDICATION SUMMARY - MEDICATIONS TO CHANGE
I will SWITCH the dose or number of times a day I take the medications listed below when I get home from the hospital:    Lantus 100 units/mL subcutaneous solution  -- 15 unit(s) subcutaneous 2 times a day    fenofibrate 160 mg oral tablet  -- 1 tab(s) by mouth once a day

## 2017-07-05 NOTE — DISCHARGE NOTE ADULT - PRINCIPAL DIAGNOSIS
Atypical chest pain Acute pancreatitis, unspecified complication status, unspecified pancreatitis type

## 2017-07-05 NOTE — DISCHARGE NOTE ADULT - CARE PROVIDER_API CALL
Nico Valles (DO), Internal Medicine  [address] [place] [address]  Phone: [phone]  Fax: [phone] Reena Rogers (DO), Medicine  [address] [facility] [address] [place] [address]  Phone: [phone]  Fax: [phone]

## 2017-07-05 NOTE — PROGRESS NOTE ADULT - SUBJECTIVE AND OBJECTIVE BOX
INTERVAL HPI/OVERNIGHT EVENTS:    pt reports his abdominal pain is still present, only on Right side, but has improved a lot since admission  no n/v/d/c    MEDICATIONS  (STANDING):  aspirin enteric coated 81 milliGRAM(s) Oral daily  fenofibrate Tablet 145 milliGRAM(s) Oral daily  gabapentin 400 milliGRAM(s) Oral every 8 hours  montelukast 10 milliGRAM(s) Oral daily  omega-3-Acid Ethyl Esters 2 Gram(s) Oral two times a day  simvastatin 20 milliGRAM(s) Oral at bedtime  cyanocobalamin 500 MICROGram(s) Oral daily  enoxaparin Injectable 40 milliGRAM(s) SubCutaneous every 24 hours  insulin lispro (HumaLOG) corrective regimen sliding scale   SubCutaneous three times a day before meals  insulin lispro (HumaLOG) corrective regimen sliding scale   SubCutaneous at bedtime  dextrose 50% Injectable 12.5 Gram(s) IV Push once  dextrose 50% Injectable 25 Gram(s) IV Push once  dextrose 50% Injectable 25 Gram(s) IV Push once  pantoprazole    Tablet 40 milliGRAM(s) Oral before breakfast  docusate sodium 100 milliGRAM(s) Oral daily  senna 2 Tablet(s) Oral at bedtime  insulin lispro Injectable (HumaLOG) 10 Unit(s) SubCutaneous three times a day before meals  sodium chloride 0.9%. 1000 milliLiter(s) (100 mL/Hr) IV Continuous <Continuous>  insulin glargine Injectable (LANTUS) 20 Unit(s) SubCutaneous two times a day    MEDICATIONS  (PRN):  dextrose Gel 1 Dose(s) Oral once PRN Blood Glucose LESS THAN 70 milliGRAM(s)/deciliter  glucagon  Injectable 1 milliGRAM(s) IntraMuscular once PRN Glucose LESS THAN 70 milligrams/deciliter      Allergies    No Known Allergies    Intolerances    Motrin (Stomach Upset)      Review of Systems:    General:  No wt loss, fevers, chills, night sweats,fatigue,   Eyes:  Good vision, no reported pain  ENT:  No sore throat, pain, runny nose, dysphagia  CV:  No pain, palpitatioins, hypo/hypertension  Resp:  No dyspnea, cough, tachypnea, wheezing  GI:  mild ruq pain, No nausea, No vomiting, No diarrhea, No constipatiion, No weight loss, No fever, No pruritis, No rectal bleeding, No tarry stools, No dysphagia,  :  No pain, bleeding, incontinence, nocturia  Muscle:  No pain, weakness  Neuro:  No weakness, tingling, memory problems  Psych:  No fatigue, insomnia, mood problems, depression  Endocrine:  No polyuria, polydypsia, cold/heat intolerance  Heme:  No petechiae, ecchymosis, easy bruisability  Skin:  No rash, tattoos, scars, edema      Vital Signs Last 24 Hrs  T(C): 36.6 (05 Jul 2017 05:29), Max: 36.6 (04 Jul 2017 21:31)  T(F): 97.8 (05 Jul 2017 05:29), Max: 97.8 (04 Jul 2017 21:31)  HR: 74 (05 Jul 2017 05:29) (61 - 74)  BP: 124/57 (05 Jul 2017 05:29) (124/57 - 143/73)  BP(mean): --  RR: 17 (05 Jul 2017 05:29) (17 - 18)  SpO2: 100% (05 Jul 2017 05:29) (100% - 100%)    PHYSICAL EXAM:    Constitutional: NAD, well-developed  HEENT: EOMI, throat clear  Neck: No LAD, supple  Respiratory: CTA and P  Cardiovascular: S1 and S2, RRR, no M  Gastrointestinal: BS+, soft, mildly ttp  Extremities: No peripheral edema, neg clubing, cyanosis  Vascular: 2+ peripheral pulses  Neurological: A/O x 3, no focal deficits  Psychiatric: Normal mood, normal affect  Skin: No rashes      LABS:                        12.2   5.14  )-----------( 153      ( 05 Jul 2017 05:30 )             38.1     07-05    138  |  100  |  25<H>  ----------------------------<  271<H>  4.4   |  24  |  1.00    Ca    10.0      05 Jul 2017 05:30  Mg     1.9     07-05        RADIOLOGY & ADDITIONAL TESTS:    < from: MRI Abdomen w/wo Cont (07.04.17 @ 08:14) >    EXAM:  MRI ABDOMEN [facility] O CONTRAST        PROCEDURE DATE:  Jul 4 2017         INTERPRETATION:  CLINICAL INFORMATION: Right upper quadrant pain.   Elevated lipase. Evaluate for common bile duct stone.    COMPARISON: Correlated with the ultrasound from 2 days prior.    PROCEDURE:   MRI of the abdomen was performed with and without intravenous contrast.  8 mL of Gadavist was administered intravenously without complication and   2 mL was discarded.  MRCP was performed.    FINDINGS:    LOWER CHEST: Within normal limits.    LIVER: Morphologic features of cirrhosis.  BILE DUCTS: Normal caliber. Common bile measures 2 mm. No   choledocholithiasis.  GALLBLADDER: Within normal limits.  SPLEEN: Within normal limits.  PANCREAS: Mild edema around pancreatic head. No pancreatic necrosis. No   collection.  ADRENALS: Within normal limits.  KIDNEYS/URETERS: Within normal limits.    VISUALIZED PORTIONS:    BOWEL: Within normal limits.   PERITONEUM: No ascites.  VESSELS: 2 cm ectasia of the infrarenal abdominal aorta.  RETROPERITONEUM: No lymphadenopathy.    ABDOMINAL WALL: Within normal limits.  BONES: Within normal limits.    IMPRESSION: Acute interstitial pancreatitis. Cirrhosis. Normal biliary   tree.

## 2017-07-05 NOTE — PROGRESS NOTE ADULT - ASSESSMENT
65 yo male PMHx of Uncontrolled DM, HLD, Appendectomy and Diabetic Neuropathy p/w Right sided chest pain radiating to R sided abdomen for past 3 days. US Abd revealed No cholelithiasis or sonographic evidence of acute cholecystitis and Somewhat nodular contour of the liver, which may represent cirrhosis. LFTs were within normal limits on lab work and Lipase is mildly elevated. r/o biliary colic vs pancreatitis . MRCP noted with acute interstitial pancreatitis and normal biliary tree

## 2017-07-05 NOTE — DISCHARGE NOTE ADULT - ADDITIONAL INSTRUCTIONS
Follow up with Dr. Rogers (gastroenterologist) within 2 weeks; call for appointment.  You will need to have an EGD with Dr. Rogers as outpatient.

## 2017-07-05 NOTE — PROGRESS NOTE ADULT - SUBJECTIVE AND OBJECTIVE BOX
Patient is a 66y old  Male who presents with a chief complaint of chest pain (05 Jul 2017 12:41)      SUBJECTIVE / OVERNIGHT EVENTS: Pt reports persisting abd pain, although improving with meds    MEDICATIONS  (STANDING):  aspirin enteric coated 81 milliGRAM(s) Oral daily  fenofibrate Tablet 145 milliGRAM(s) Oral daily  gabapentin 400 milliGRAM(s) Oral every 8 hours  montelukast 10 milliGRAM(s) Oral daily  omega-3-Acid Ethyl Esters 2 Gram(s) Oral two times a day  simvastatin 20 milliGRAM(s) Oral at bedtime  cyanocobalamin 500 MICROGram(s) Oral daily  enoxaparin Injectable 40 milliGRAM(s) SubCutaneous every 24 hours  insulin lispro (HumaLOG) corrective regimen sliding scale   SubCutaneous three times a day before meals  insulin lispro (HumaLOG) corrective regimen sliding scale   SubCutaneous at bedtime  dextrose 50% Injectable 12.5 Gram(s) IV Push once  dextrose 50% Injectable 25 Gram(s) IV Push once  dextrose 50% Injectable 25 Gram(s) IV Push once  pantoprazole    Tablet 40 milliGRAM(s) Oral before breakfast  docusate sodium 100 milliGRAM(s) Oral daily  senna 2 Tablet(s) Oral at bedtime  insulin lispro Injectable (HumaLOG) 10 Unit(s) SubCutaneous three times a day before meals  sodium chloride 0.9%. 1000 milliLiter(s) (100 mL/Hr) IV Continuous <Continuous>  insulin glargine Injectable (LANTUS) 20 Unit(s) SubCutaneous two times a day    MEDICATIONS  (PRN):  dextrose Gel 1 Dose(s) Oral once PRN Blood Glucose LESS THAN 70 milliGRAM(s)/deciliter  glucagon  Injectable 1 milliGRAM(s) IntraMuscular once PRN Glucose LESS THAN 70 milligrams/deciliter      Vital Signs Last 24 Hrs  T(C): 36.9 (07-05-17 @ 13:40), Max: 36.9 (07-05-17 @ 13:40)  HR: 78 (07-05-17 @ 13:40) (61 - 78)  BP: 119/70 (07-05-17 @ 13:40) (119/70 - 143/73)  RR: 18 (07-05-17 @ 13:40) (17 - 18)  SpO2: 98% (07-05-17 @ 13:40) (98% - 100%)  CAPILLARY BLOOD GLUCOSE  269 (05 Jul 2017 11:08)  232 (05 Jul 2017 08:52)  231 (04 Jul 2017 21:35)  224 (04 Jul 2017 16:43)        I&O's Summary    04 Jul 2017 07:01  -  05 Jul 2017 07:00  --------------------------------------------------------  IN: 1694 mL / OUT: 0 mL / NET: 1694 mL    05 Jul 2017 07:01  -  05 Jul 2017 16:14  --------------------------------------------------------  IN: 740 mL / OUT: 0 mL / NET: 740 mL        PHYSICAL EXAM:  GENERAL: NAD,   HEAD:  Normocephalic  EYES: EOMI,  conjunctiva and sclera clear  NECK: Supple,   CHEST/LUNG: Clear to auscultation bilaterally; No wheeze  HEART:  s1 s2 + Regular rate and rhythm;   ABDOMEN: Soft, Nontender, Nondistended; Bowel sounds present  EXTREMITIES:   No clubbing, cyanosis  NEURO: AAOx3      LABS:                        12.2   5.14  )-----------( 153      ( 05 Jul 2017 05:30 )             38.1     07-05    138  |  100  |  25<H>  ----------------------------<  271<H>  4.4   |  24  |  1.00    Ca    10.0      05 Jul 2017 05:30  Mg     1.9     07-05        Consultant(s) Notes Reviewed:  GI

## 2017-07-05 NOTE — DISCHARGE NOTE ADULT - INSTRUCTIONS
Regular:  Diabetic, Low Salt, Low Cholesterol Continue diet modification. Avoid complex carbohydrates such as bread, pasta, cereal, white rice, white potatoes, etc. Avoid concentrated sugar as found in desserts, candy, soda, juice, etc. Consume a diet based on lean protein (chicken, fish) and vegetables. Low salt, low fat diet.

## 2017-07-05 NOTE — DISCHARGE NOTE ADULT - PLAN OF CARE
Resolution of symptoms and close gastroenterology follow up with Dr. Rogers. Continue medications as prescribed including protonix daily.   Low fat diet.  Follow up with gastroenterologist Dr. Rogers within 2 weeks.  You will need to get an EGD as outpatient. Continue medications as prescribed.  Low fat diet.  Follow up with gastroenterologist Dr. Rogers within 2 weeks.  You will need to get an EGD as outpatient. Continue simvastatin as prescribed.   Low fat diet.  Follow up with your gastroenterologist and PCP. Continue medications as prescribed.  Low sugar diet.  Monitor your fingersticks before each meal and before bedtime.  Follow up with your PCP for further monitoring and management.  Get yearly eye and foot exams.  Check your HgA1C blood test every 3 months. Follow up with your PCP.

## 2017-07-05 NOTE — DISCHARGE NOTE ADULT - HOSPITAL COURSE
67 yo male PMHx of Uncontrolled DM, HLD, Asthma and Diabetic Neuropathy p/w Right sided chest pain radiating to R sided abdomen for past 3 days. Chest pain started on Friday, located on right side of the chest, 7/10, radiating to right side of abdomen, non-pleuritic, intermittent in nature. No alleviating factors noticed. Pt denies fever, chills, SOB, palpitations, diaphoresis, nausea, vomiting or diarrhea.  EKG: NSR @ 86 bpm with Frequent PVC  Gene x1: neg  US Abd: No cholelithiasis or sonographic evidence of acute cholecystitis. Somewhat nodular contour of the liver, which was noted on 5/24/2017 and  may be due to cirrhosis. Recommend clinical correlation.  probnp: 68.37  HgA1c: 12.1-->added nutritional humalog  CXR: Right upper lung scarring. Otherwise, clear lungs.  7/2/17 > GI Dr Willoughby > roblem: Pain of upper abdomen. Recommendation: -US Abd reviewed with possible Cirrhosis daily labs, simethicone q6h ppi -hepatitis panel ordered, liver serologies ordered MRCP to assess abdominal pain to r/o biliary source low fat diet-US Abd reviewed with possible Cirrhosis  --hepatitis panel ordered  -liver serologies ordered  -CT Abd/Pelvis to assess abdominal pain to r/o colitis vs pancreatitis vs colick  -low fat diet.?dark stools intermittently over past few months, cbc qd; hgb stable, ppi qd check guaiac,   monitor stool color may need EGD inpatient vs outpatient.   MRCP: Acute interstitial pancreatitis. Cirrhosis. Normal biliary tree. HPI:  67 yo male PMHx of Uncontrolled DM, HLD, Asthma and Diabetic Neuropathy p/w Right sided chest pain radiating to R sided abdomen for past 3 days. Chest pain started on Friday, located on right side of the chest, 7/10, radiating to right side of abdomen, non-pleuritic, intermittent in nature. No alleviating factors noticed. Pt denies fever, chills, SOB, palpitations, diaphoresis, nausea, vomiting or diarrhea. (02 Jul 2017 08:41)    On admission:  EKG: NSR @ 86 bpm with Frequent PVC  Gene x2: neg  US Abd: No cholelithiasis or sonographic evidence of acute cholecystitis. Somewhat nodular contour of the liver, which was noted on 5/24/2017 and  may be due to cirrhosis. Recommend clinical correlation.  probnp: 68.37  Lipase: 67  HgA1c: 12.1-->added nutritional humalog 10units before each meal, DM education provided.  CXR: Right upper lung scarring. Otherwise, clear lungs.  MRCP: Acute interstitial pancreatitis. Cirrhosis. Normal biliary tree.    Echo: EF 63%  1. Mitral annular calcification, otherwise normal mitral valve. Minimal mitral regurgitation.  2. Normal left ventricular internal dimensions and wall thicknesses.  3. Endocardium not well visualized; grossly normal left ventricular systolic function.  4. The right ventricle is not well visualized; grossly normal right ventricular systolic function.  *** Compared with echocardiogram of 10/29/2015, no significant changes noted.    Patient evaluated by GI (Dr. Valles) for pain of upper abdomen. MRCP performed, noting cirrhosis & acute pancreatitis.  Recommended pain control prn, ppi qd, hepatitis panel negative, liver serologies negative, low fat diet, pain improved since admission with IVF hydration. Dark stools. ?dark stools intermittently over past few months; since admission brown. Hgb remained stable. guaiac negative. c/w ppi qd. Discussed at length with patient to fu outpatient fu with private gastroenterology, Dr. Rogers in 2 weeks for EGD. Chest pain atypical due to acute pancreatitis.  Patient ruled out for ACS.  Patient on asa and statin.      Patient cleared for discharge by attending MD.  Outpatient follow up with Dr. Rogers within 1-2 weeks. HPI:  65 yo male PMHx of Uncontrolled DM, HLD, Asthma and Diabetic Neuropathy p/w Right sided chest pain radiating to R sided abdomen for past 3 days. Chest pain started on Friday, located on right side of the chest, 7/10, radiating to right side of abdomen, non-pleuritic, intermittent in nature. No alleviating factors noticed. Pt denies fever, chills, SOB, palpitations, diaphoresis, nausea, vomiting or diarrhea. (02 Jul 2017 08:41)    On admission:  EKG: NSR @ 86 bpm with Frequent PVC  Gene x2: neg  US Abd: No cholelithiasis or sonographic evidence of acute cholecystitis. Somewhat nodular contour of the liver, which was noted on 5/24/2017 and  may be due to cirrhosis. Recommend clinical correlation.  probnp: 68.37  Lipase: 67  HgA1c: 12.1--> DM education provided.  CXR: Right upper lung scarring. Otherwise, clear lungs.  MRCP: Acute interstitial pancreatitis. Cirrhosis. Normal biliary tree.    Echo: EF 63%  1. Mitral annular calcification, otherwise normal mitral valve. Minimal mitral regurgitation.  2. Normal left ventricular internal dimensions and wall thicknesses.  3. Endocardium not well visualized; grossly normal left ventricular systolic function.  4. The right ventricle is not well visualized; grossly normal right ventricular systolic function.  *** Compared with echocardiogram of 10/29/2015, no significant changes noted.    Patient evaluated by GI (Dr. Valles) for pain of upper abdomen. MRCP performed, noting cirrhosis & acute pancreatitis.  Recommended pain control prn, ppi qd, hepatitis panel negative, liver serologies negative, low fat diet, pain improved since admission with IVF hydration. Dark stools. ?dark stools intermittently over past few months; since admission brown. Hgb remained stable. guaiac negative. c/w ppi qd. Discussed at length with patient to fu outpatient fu with private gastroenterology, Dr. Rogers in 2 weeks for EGD. Chest pain atypical due to acute pancreatitis.  Patient ruled out for ACS.  Patient on asa and statin.      Patient cleared for discharge by attending MD.  Outpatient follow up with Dr. Rogers within 1-2 weeks.

## 2017-07-05 NOTE — DISCHARGE NOTE ADULT - CARE PLAN
Principal Discharge DX:	Atypical chest pain Principal Discharge DX:	Acute pancreatitis, unspecified complication status, unspecified pancreatitis type  Goal:	Resolution of symptoms and close gastroenterology follow up with Dr. Rogers.  Instructions for follow-up, activity and diet:	Continue medications as prescribed including protonix daily.   Low fat diet.  Follow up with gastroenterologist Dr. Rogers within 2 weeks.  You will need to get an EGD as outpatient.  Secondary Diagnosis:	Cirrhosis of liver without ascites, unspecified hepatic cirrhosis type  Instructions for follow-up, activity and diet:	Continue medications as prescribed.  Low fat diet.  Follow up with gastroenterologist Dr. Rogers within 2 weeks.  You will need to get an EGD as outpatient.  Secondary Diagnosis:	HLD (hyperlipidemia)  Instructions for follow-up, activity and diet:	Continue simvastatin as prescribed.   Low fat diet.  Follow up with your gastroenterologist and PCP.  Secondary Diagnosis:	Uncontrolled diabetes mellitus  Instructions for follow-up, activity and diet:	Continue medications as prescribed.  Low sugar diet.  Monitor your fingersticks before each meal and before bedtime.  Follow up with your PCP for further monitoring and management.  Get yearly eye and foot exams.  Check your HgA1C blood test every 3 months.  Secondary Diagnosis:	Asthma  Instructions for follow-up, activity and diet:	Follow up with your PCP.

## 2017-07-05 NOTE — DISCHARGE NOTE ADULT - MEDICATION SUMMARY - MEDICATIONS TO TAKE
I will START or STAY ON the medications listed below when I get home from the hospital:    Ecotrin Adult Low Strength 81 mg oral delayed release tablet  -- 1 tab(s) by mouth once a day  -- Indication: For Prevention of Coronary Artery Disease    gabapentin 400 mg oral tablet  -- 1 tab(s) by mouth 3 times a day  -- Indication: For Neuropathy    metFORMIN 1000 mg oral tablet  -- 1 tab(s) by mouth 2 times a day  -- Indication: For Diabetes    Lantus 100 units/mL subcutaneous solution  -- 20 unit(s) subcutaneous 2 times a day  -- Indication: For Diabetes    fenofibrate 145 mg oral tablet  -- 1 tab(s) by mouth once a day  -- Indication: For HLD (hyperlipidemia)    simvastatin 20 mg oral tablet  -- 1 tab(s) by mouth once a day (at bedtime)  -- Indication: For HLD (hyperlipidemia)    montelukast 10 mg oral tablet  -- 1 tab(s) by mouth once a day  -- Indication: For Asthma    Omega-3 1000 mg oral capsule  -- 1 cap(s) by mouth 2 times a day  -- Indication: For Omega 3    pantoprazole 40 mg oral delayed release tablet  -- 1 tab(s) by mouth once a day (before a meal)  -- Indication: For Acute pancreatitis    Vitamin B12 500 mcg oral tablet  -- 1 tab(s) by mouth once a day  -- Indication: For Vitamin B12

## 2017-07-05 NOTE — DISCHARGE NOTE ADULT - SECONDARY DIAGNOSIS.
Cirrhosis of liver without ascites, unspecified hepatic cirrhosis type HLD (hyperlipidemia) Uncontrolled diabetes mellitus Asthma

## 2017-07-06 LAB
BASOPHILS # BLD AUTO: 0.03 K/UL — SIGNIFICANT CHANGE UP (ref 0–0.2)
BASOPHILS NFR BLD AUTO: 0.7 % — SIGNIFICANT CHANGE UP (ref 0–2)
BUN SERPL-MCNC: 18 MG/DL — SIGNIFICANT CHANGE UP (ref 7–23)
CALCIUM SERPL-MCNC: 8.5 MG/DL — SIGNIFICANT CHANGE UP (ref 8.4–10.5)
CHLORIDE SERPL-SCNC: 101 MMOL/L — SIGNIFICANT CHANGE UP (ref 98–107)
CO2 SERPL-SCNC: 24 MMOL/L — SIGNIFICANT CHANGE UP (ref 22–31)
CREAT SERPL-MCNC: 0.71 MG/DL — SIGNIFICANT CHANGE UP (ref 0.5–1.3)
EOSINOPHIL # BLD AUTO: 0.08 K/UL — SIGNIFICANT CHANGE UP (ref 0–0.5)
EOSINOPHIL NFR BLD AUTO: 1.9 % — SIGNIFICANT CHANGE UP (ref 0–6)
GLUCOSE SERPL-MCNC: 217 MG/DL — HIGH (ref 70–99)
HCT VFR BLD CALC: 32.6 % — LOW (ref 39–50)
HCT VFR BLD CALC: 34.5 % — LOW (ref 39–50)
HGB BLD-MCNC: 10.6 G/DL — LOW (ref 13–17)
HGB BLD-MCNC: 10.9 G/DL — LOW (ref 13–17)
IMM GRANULOCYTES # BLD AUTO: 0.01 # — SIGNIFICANT CHANGE UP
IMM GRANULOCYTES NFR BLD AUTO: 0.2 % — SIGNIFICANT CHANGE UP (ref 0–1.5)
LYMPHOCYTES # BLD AUTO: 2.43 K/UL — SIGNIFICANT CHANGE UP (ref 1–3.3)
LYMPHOCYTES # BLD AUTO: 59.1 % — HIGH (ref 13–44)
MAGNESIUM SERPL-MCNC: 1.8 MG/DL — SIGNIFICANT CHANGE UP (ref 1.6–2.6)
MCHC RBC-ENTMCNC: 26.1 PG — LOW (ref 27–34)
MCHC RBC-ENTMCNC: 26.6 PG — LOW (ref 27–34)
MCHC RBC-ENTMCNC: 31.6 % — LOW (ref 32–36)
MCHC RBC-ENTMCNC: 32.5 % — SIGNIFICANT CHANGE UP (ref 32–36)
MCV RBC AUTO: 81.9 FL — SIGNIFICANT CHANGE UP (ref 80–100)
MCV RBC AUTO: 82.7 FL — SIGNIFICANT CHANGE UP (ref 80–100)
MONOCYTES # BLD AUTO: 0.46 K/UL — SIGNIFICANT CHANGE UP (ref 0–0.9)
MONOCYTES NFR BLD AUTO: 11.2 % — SIGNIFICANT CHANGE UP (ref 2–14)
NEUTROPHILS # BLD AUTO: 1.1 K/UL — LOW (ref 1.8–7.4)
NEUTROPHILS NFR BLD AUTO: 26.9 % — LOW (ref 43–77)
NRBC # FLD: 0 — SIGNIFICANT CHANGE UP
NRBC # FLD: 0 — SIGNIFICANT CHANGE UP
PLATELET # BLD AUTO: 132 K/UL — LOW (ref 150–400)
PLATELET # BLD AUTO: 134 K/UL — LOW (ref 150–400)
PMV BLD: 12.2 FL — SIGNIFICANT CHANGE UP (ref 7–13)
PMV BLD: 12.3 FL — SIGNIFICANT CHANGE UP (ref 7–13)
POTASSIUM SERPL-MCNC: 4.1 MMOL/L — SIGNIFICANT CHANGE UP (ref 3.5–5.3)
POTASSIUM SERPL-SCNC: 4.1 MMOL/L — SIGNIFICANT CHANGE UP (ref 3.5–5.3)
RBC # BLD: 3.98 M/UL — LOW (ref 4.2–5.8)
RBC # BLD: 4.17 M/UL — LOW (ref 4.2–5.8)
RBC # FLD: 14.3 % — SIGNIFICANT CHANGE UP (ref 10.3–14.5)
RBC # FLD: 14.6 % — HIGH (ref 10.3–14.5)
SODIUM SERPL-SCNC: 138 MMOL/L — SIGNIFICANT CHANGE UP (ref 135–145)
WBC # BLD: 3.62 K/UL — LOW (ref 3.8–10.5)
WBC # BLD: 4.11 K/UL — SIGNIFICANT CHANGE UP (ref 3.8–10.5)
WBC # FLD AUTO: 3.62 K/UL — LOW (ref 3.8–10.5)
WBC # FLD AUTO: 4.11 K/UL — SIGNIFICANT CHANGE UP (ref 3.8–10.5)

## 2017-07-06 PROCEDURE — 93306 TTE W/DOPPLER COMPLETE: CPT | Mod: 26

## 2017-07-06 PROCEDURE — 99232 SBSQ HOSP IP/OBS MODERATE 35: CPT

## 2017-07-06 RX ADMIN — INSULIN GLARGINE 20 UNIT(S): 100 INJECTION, SOLUTION SUBCUTANEOUS at 09:18

## 2017-07-06 RX ADMIN — SENNA PLUS 2 TABLET(S): 8.6 TABLET ORAL at 22:38

## 2017-07-06 RX ADMIN — INSULIN GLARGINE 20 UNIT(S): 100 INJECTION, SOLUTION SUBCUTANEOUS at 23:26

## 2017-07-06 RX ADMIN — GABAPENTIN 400 MILLIGRAM(S): 400 CAPSULE ORAL at 06:22

## 2017-07-06 RX ADMIN — TRAMADOL HYDROCHLORIDE 50 MILLIGRAM(S): 50 TABLET ORAL at 06:22

## 2017-07-06 RX ADMIN — Medication 2 GRAM(S): at 06:22

## 2017-07-06 RX ADMIN — GABAPENTIN 400 MILLIGRAM(S): 400 CAPSULE ORAL at 22:37

## 2017-07-06 RX ADMIN — Medication 1: at 22:38

## 2017-07-06 RX ADMIN — SIMVASTATIN 20 MILLIGRAM(S): 20 TABLET, FILM COATED ORAL at 22:38

## 2017-07-06 RX ADMIN — TRAMADOL HYDROCHLORIDE 50 MILLIGRAM(S): 50 TABLET ORAL at 08:53

## 2017-07-06 RX ADMIN — PANTOPRAZOLE SODIUM 40 MILLIGRAM(S): 20 TABLET, DELAYED RELEASE ORAL at 06:27

## 2017-07-06 RX ADMIN — Medication 10 UNIT(S): at 09:16

## 2017-07-06 RX ADMIN — Medication 2: at 09:17

## 2017-07-06 NOTE — PROGRESS NOTE ADULT - PROBLEM SELECTOR PLAN 4
Uncontrolled DM-2 with hyperglycemia, A1c 12.1, FS within acceptable limit inpt, ? Non-compliance at home, Diabetes education  c/w Insulin  Trend FS Uncontrolled DM-2 with hyperglycemia, A1c 12.1, FS fluctuate inpt, ? dietary indiscretions, Diabetes education  c/w Insulin  Trend FS

## 2017-07-06 NOTE — PROGRESS NOTE ADULT - SUBJECTIVE AND OBJECTIVE BOX
INTERVAL HPI/OVERNIGHT EVENTS:    pt reports that he is feeling improved but still with some mild discomfort in right abd  tolerating po intake well, eating all meals and snacks    MEDICATIONS  (STANDING):  aspirin enteric coated 81 milliGRAM(s) Oral daily  fenofibrate Tablet 145 milliGRAM(s) Oral daily  gabapentin 400 milliGRAM(s) Oral every 8 hours  montelukast 10 milliGRAM(s) Oral daily  omega-3-Acid Ethyl Esters 2 Gram(s) Oral two times a day  simvastatin 20 milliGRAM(s) Oral at bedtime  cyanocobalamin 500 MICROGram(s) Oral daily  enoxaparin Injectable 40 milliGRAM(s) SubCutaneous every 24 hours  insulin lispro (HumaLOG) corrective regimen sliding scale   SubCutaneous three times a day before meals  insulin lispro (HumaLOG) corrective regimen sliding scale   SubCutaneous at bedtime  dextrose 50% Injectable 12.5 Gram(s) IV Push once  dextrose 50% Injectable 25 Gram(s) IV Push once  dextrose 50% Injectable 25 Gram(s) IV Push once  pantoprazole    Tablet 40 milliGRAM(s) Oral before breakfast  docusate sodium 100 milliGRAM(s) Oral daily  senna 2 Tablet(s) Oral at bedtime  insulin lispro Injectable (HumaLOG) 10 Unit(s) SubCutaneous three times a day before meals  insulin glargine Injectable (LANTUS) 20 Unit(s) SubCutaneous two times a day  sodium chloride 0.9%. 1000 milliLiter(s) (100 mL/Hr) IV Continuous <Continuous>  traMADol 50 milliGRAM(s) Oral every 8 hours    MEDICATIONS  (PRN):  dextrose Gel 1 Dose(s) Oral once PRN Blood Glucose LESS THAN 70 milliGRAM(s)/deciliter  glucagon  Injectable 1 milliGRAM(s) IntraMuscular once PRN Glucose LESS THAN 70 milligrams/deciliter      Allergies    No Known Allergies    Intolerances    Motrin (Stomach Upset)      Review of Systems:    General:  No wt loss, fevers, chills, night sweats,fatigue,   Eyes:  Good vision, no reported pain  ENT:  No sore throat, pain, runny nose, dysphagia  CV:  No pain, palpitatioins, hypo/hypertension  Resp:  No dyspnea, cough, tachypnea, wheezing  GI:  improving rt abd pain, No nausea, No vomiting, No diarrhea, No constipatiion, No weight loss, No fever, No pruritis, No rectal bleeding, No tarry stools, No dysphagia,  :  No pain, bleeding, incontinence, nocturia  Muscle:  No pain, weakness  Neuro:  No weakness, tingling, memory problems  Psych:  No fatigue, insomnia, mood problems, depression  Endocrine:  No polyuria, polydypsia, cold/heat intolerance  Heme:  No petechiae, ecchymosis, easy bruisability  Skin:  No rash, tattoos, scars, edema      Vital Signs Last 24 Hrs  T(C): 36.9 (06 Jul 2017 05:19), Max: 36.9 (05 Jul 2017 13:40)  T(F): 98.4 (06 Jul 2017 05:19), Max: 98.4 (05 Jul 2017 13:40)  HR: 72 (06 Jul 2017 05:19) (68 - 78)  BP: 111/65 (06 Jul 2017 05:19) (111/65 - 137/77)  BP(mean): --  RR: 17 (06 Jul 2017 05:19) (17 - 18)  SpO2: 99% (06 Jul 2017 05:19) (98% - 99%)    PHYSICAL EXAM:    Constitutional: NAD, well-developed  HEENT: EOMI, throat clear  Neck: No LAD, supple  Respiratory: CTA and P  Cardiovascular: S1 and S2, RRR, no M  Gastrointestinal: BS+, soft, NT/ND, neg HSM,  Extremities: No peripheral edema, neg clubing, cyanosis  Vascular: 2+ peripheral pulses  Neurological: A/O x 3, no focal deficits  Psychiatric: Normal mood, normal affect  Skin: No rashes      LABS:                        10.6   4.11  )-----------( 132      ( 06 Jul 2017 06:00 )             32.6     07-06    138  |  101  |  18  ----------------------------<  217<H>  4.1   |  24  |  0.71    Ca    8.5      06 Jul 2017 06:00  Mg     1.8     07-06            RADIOLOGY & ADDITIONAL TESTS:

## 2017-07-06 NOTE — PROGRESS NOTE ADULT - ASSESSMENT
67 yo male PMHx of Uncontrolled DM, HLD, and Diabetic Neuropathy p/w Right sided chest pain radiating to R sided abdomen for past 3 days admitted to tele for atypical chest pain, r/o ACS.

## 2017-07-06 NOTE — PROGRESS NOTE ADULT - SUBJECTIVE AND OBJECTIVE BOX
Patient is a 66y old  Male who presents with a chief complaint of chest pain (05 Jul 2017 12:41)      SUBJECTIVE / OVERNIGHT EVENTS: Pt reports persisting abd pain, he doesn't want to go home    MEDICATIONS  (STANDING):  aspirin enteric coated 81 milliGRAM(s) Oral daily  fenofibrate Tablet 145 milliGRAM(s) Oral daily  gabapentin 400 milliGRAM(s) Oral every 8 hours  montelukast 10 milliGRAM(s) Oral daily  omega-3-Acid Ethyl Esters 2 Gram(s) Oral two times a day  simvastatin 20 milliGRAM(s) Oral at bedtime  cyanocobalamin 500 MICROGram(s) Oral daily  enoxaparin Injectable 40 milliGRAM(s) SubCutaneous every 24 hours  insulin lispro (HumaLOG) corrective regimen sliding scale   SubCutaneous three times a day before meals  insulin lispro (HumaLOG) corrective regimen sliding scale   SubCutaneous at bedtime  dextrose 50% Injectable 12.5 Gram(s) IV Push once  dextrose 50% Injectable 25 Gram(s) IV Push once  dextrose 50% Injectable 25 Gram(s) IV Push once  pantoprazole    Tablet 40 milliGRAM(s) Oral before breakfast  docusate sodium 100 milliGRAM(s) Oral daily  senna 2 Tablet(s) Oral at bedtime  insulin lispro Injectable (HumaLOG) 10 Unit(s) SubCutaneous three times a day before meals  insulin glargine Injectable (LANTUS) 20 Unit(s) SubCutaneous two times a day  sodium chloride 0.9%. 1000 milliLiter(s) (100 mL/Hr) IV Continuous <Continuous>    MEDICATIONS  (PRN):  dextrose Gel 1 Dose(s) Oral once PRN Blood Glucose LESS THAN 70 milliGRAM(s)/deciliter  glucagon  Injectable 1 milliGRAM(s) IntraMuscular once PRN Glucose LESS THAN 70 milligrams/deciliter      Vital Signs Last 24 Hrs  T(C): 36.9 (07-06-17 @ 05:19), Max: 36.9 (07-05-17 @ 21:53)  HR: 72 (07-06-17 @ 05:19) (68 - 72)  BP: 111/65 (07-06-17 @ 05:19) (111/65 - 137/77)  RR: 17 (07-06-17 @ 05:19) (17 - 17)  SpO2: 99% (07-06-17 @ 05:19) (99% - 99%)  CAPILLARY BLOOD GLUCOSE  203 (06 Jul 2017 11:14)  221 (06 Jul 2017 08:26)  287 (05 Jul 2017 21:23)  289 (05 Jul 2017 17:01)        I&O's Summary    05 Jul 2017 07:01  -  06 Jul 2017 07:00  --------------------------------------------------------  IN: 740 mL / OUT: 0 mL / NET: 740 mL        PHYSICAL EXAM:  GENERAL: NAD,   HEAD:  Normocephalic  EYES: EOMI,  conjunctiva and sclera clear  NECK: Supple,   CHEST/LUNG: Clear to auscultation bilaterally; No wheeze  HEART:  s1 s2 + Regular rate and rhythm;   ABDOMEN: mild right sided tenderness   EXTREMITIES:   No clubbing, cyanosis  NEURO: AAOx3      LABS:                        10.6   4.11  )-----------( 132      ( 06 Jul 2017 06:00 )             32.6     07-06    138  |  101  |  18  ----------------------------<  217<H>  4.1   |  24  |  0.71    Ca    8.5      06 Jul 2017 06:00  Mg     1.8     07-06              Consultant(s) Notes Reviewed:  GI

## 2017-07-07 VITALS — WEIGHT: 162.26 LBS

## 2017-07-07 LAB
BASOPHILS # BLD AUTO: 0.04 K/UL — SIGNIFICANT CHANGE UP (ref 0–0.2)
BASOPHILS NFR BLD AUTO: 1.1 % — SIGNIFICANT CHANGE UP (ref 0–2)
BUN SERPL-MCNC: 12 MG/DL — SIGNIFICANT CHANGE UP (ref 7–23)
BUN SERPL-MCNC: 12 MG/DL — SIGNIFICANT CHANGE UP (ref 7–23)
CALCIUM SERPL-MCNC: 9.9 MG/DL — SIGNIFICANT CHANGE UP (ref 8.4–10.5)
CALCIUM SERPL-MCNC: 9.9 MG/DL — SIGNIFICANT CHANGE UP (ref 8.4–10.5)
CHLORIDE SERPL-SCNC: 100 MMOL/L — SIGNIFICANT CHANGE UP (ref 98–107)
CHLORIDE SERPL-SCNC: 100 MMOL/L — SIGNIFICANT CHANGE UP (ref 98–107)
CO2 SERPL-SCNC: 26 MMOL/L — SIGNIFICANT CHANGE UP (ref 22–31)
CO2 SERPL-SCNC: 26 MMOL/L — SIGNIFICANT CHANGE UP (ref 22–31)
CREAT SERPL-MCNC: 0.78 MG/DL — SIGNIFICANT CHANGE UP (ref 0.5–1.3)
CREAT SERPL-MCNC: 0.78 MG/DL — SIGNIFICANT CHANGE UP (ref 0.5–1.3)
EOSINOPHIL # BLD AUTO: 0.07 K/UL — SIGNIFICANT CHANGE UP (ref 0–0.5)
EOSINOPHIL NFR BLD AUTO: 2 % — SIGNIFICANT CHANGE UP (ref 0–6)
GLUCOSE SERPL-MCNC: 207 MG/DL — HIGH (ref 70–99)
GLUCOSE SERPL-MCNC: 207 MG/DL — HIGH (ref 70–99)
HCT VFR BLD CALC: 36.8 % — LOW (ref 39–50)
HCT VFR BLD CALC: 36.8 % — LOW (ref 39–50)
HGB BLD-MCNC: 11.7 G/DL — LOW (ref 13–17)
HGB BLD-MCNC: 11.7 G/DL — LOW (ref 13–17)
IMM GRANULOCYTES # BLD AUTO: 0.01 # — SIGNIFICANT CHANGE UP
IMM GRANULOCYTES NFR BLD AUTO: 0.3 % — SIGNIFICANT CHANGE UP (ref 0–1.5)
LYMPHOCYTES # BLD AUTO: 2.06 K/UL — SIGNIFICANT CHANGE UP (ref 1–3.3)
LYMPHOCYTES # BLD AUTO: 58.9 % — HIGH (ref 13–44)
MAGNESIUM SERPL-MCNC: 1.8 MG/DL — SIGNIFICANT CHANGE UP (ref 1.6–2.6)
MCHC RBC-ENTMCNC: 26.6 PG — LOW (ref 27–34)
MCHC RBC-ENTMCNC: 26.6 PG — LOW (ref 27–34)
MCHC RBC-ENTMCNC: 31.8 % — LOW (ref 32–36)
MCHC RBC-ENTMCNC: 31.8 % — LOW (ref 32–36)
MCV RBC AUTO: 83.6 FL — SIGNIFICANT CHANGE UP (ref 80–100)
MCV RBC AUTO: 83.6 FL — SIGNIFICANT CHANGE UP (ref 80–100)
MONOCYTES # BLD AUTO: 0.36 K/UL — SIGNIFICANT CHANGE UP (ref 0–0.9)
MONOCYTES NFR BLD AUTO: 10.3 % — SIGNIFICANT CHANGE UP (ref 2–14)
NEUTROPHILS # BLD AUTO: 0.96 K/UL — LOW (ref 1.8–7.4)
NEUTROPHILS NFR BLD AUTO: 27.4 % — LOW (ref 43–77)
NRBC # FLD: 0 — SIGNIFICANT CHANGE UP
NRBC # FLD: 0 — SIGNIFICANT CHANGE UP
PLATELET # BLD AUTO: 148 K/UL — LOW (ref 150–400)
PLATELET # BLD AUTO: 148 K/UL — LOW (ref 150–400)
PMV BLD: 12.4 FL — SIGNIFICANT CHANGE UP (ref 7–13)
PMV BLD: 12.4 FL — SIGNIFICANT CHANGE UP (ref 7–13)
POTASSIUM SERPL-MCNC: 4 MMOL/L — SIGNIFICANT CHANGE UP (ref 3.5–5.3)
POTASSIUM SERPL-MCNC: 4 MMOL/L — SIGNIFICANT CHANGE UP (ref 3.5–5.3)
POTASSIUM SERPL-SCNC: 4 MMOL/L — SIGNIFICANT CHANGE UP (ref 3.5–5.3)
POTASSIUM SERPL-SCNC: 4 MMOL/L — SIGNIFICANT CHANGE UP (ref 3.5–5.3)
RBC # BLD: 4.4 M/UL — SIGNIFICANT CHANGE UP (ref 4.2–5.8)
RBC # BLD: 4.4 M/UL — SIGNIFICANT CHANGE UP (ref 4.2–5.8)
RBC # FLD: 14.7 % — HIGH (ref 10.3–14.5)
RBC # FLD: 14.7 % — HIGH (ref 10.3–14.5)
REVIEW TO FOLLOW: YES — SIGNIFICANT CHANGE UP
REVIEW TO FOLLOW: YES — SIGNIFICANT CHANGE UP
SODIUM SERPL-SCNC: 139 MMOL/L — SIGNIFICANT CHANGE UP (ref 135–145)
SODIUM SERPL-SCNC: 139 MMOL/L — SIGNIFICANT CHANGE UP (ref 135–145)
WBC # BLD: 3.5 K/UL — LOW (ref 3.8–10.5)
WBC # BLD: 3.5 K/UL — LOW (ref 3.8–10.5)
WBC # FLD AUTO: 3.5 K/UL — LOW (ref 3.8–10.5)
WBC # FLD AUTO: 3.5 K/UL — LOW (ref 3.8–10.5)

## 2017-07-07 PROCEDURE — 99239 HOSP IP/OBS DSCHRG MGMT >30: CPT

## 2017-07-07 RX ORDER — FENOFIBRATE,MICRONIZED 130 MG
1 CAPSULE ORAL
Qty: 0 | Refills: 0 | COMMUNITY

## 2017-07-07 RX ORDER — INSULIN GLARGINE 100 [IU]/ML
15 INJECTION, SOLUTION SUBCUTANEOUS
Qty: 0 | Refills: 0 | COMMUNITY

## 2017-07-07 RX ORDER — FENOFIBRATE,MICRONIZED 130 MG
1 CAPSULE ORAL
Qty: 0 | Refills: 0 | COMMUNITY
Start: 2017-07-07

## 2017-07-07 RX ORDER — INSULIN GLARGINE 100 [IU]/ML
20 INJECTION, SOLUTION SUBCUTANEOUS
Qty: 0 | Refills: 0 | COMMUNITY

## 2017-07-07 RX ORDER — PANTOPRAZOLE SODIUM 20 MG/1
1 TABLET, DELAYED RELEASE ORAL
Qty: 30 | Refills: 0 | OUTPATIENT
Start: 2017-07-07 | End: 2017-08-06

## 2017-07-07 RX ADMIN — PANTOPRAZOLE SODIUM 40 MILLIGRAM(S): 20 TABLET, DELAYED RELEASE ORAL at 05:22

## 2017-07-07 RX ADMIN — MONTELUKAST 10 MILLIGRAM(S): 4 TABLET, CHEWABLE ORAL at 11:21

## 2017-07-07 RX ADMIN — Medication 100 MILLIGRAM(S): at 11:21

## 2017-07-07 RX ADMIN — INSULIN GLARGINE 20 UNIT(S): 100 INJECTION, SOLUTION SUBCUTANEOUS at 08:42

## 2017-07-07 RX ADMIN — Medication 81 MILLIGRAM(S): at 11:22

## 2017-07-07 RX ADMIN — PREGABALIN 500 MICROGRAM(S): 225 CAPSULE ORAL at 11:22

## 2017-07-07 RX ADMIN — Medication 1: at 08:42

## 2017-07-07 RX ADMIN — ENOXAPARIN SODIUM 40 MILLIGRAM(S): 100 INJECTION SUBCUTANEOUS at 11:22

## 2017-07-07 RX ADMIN — GABAPENTIN 400 MILLIGRAM(S): 400 CAPSULE ORAL at 05:21

## 2017-07-07 RX ADMIN — Medication 4: at 11:21

## 2017-07-07 RX ADMIN — Medication 2 GRAM(S): at 05:22

## 2017-07-07 RX ADMIN — GABAPENTIN 400 MILLIGRAM(S): 400 CAPSULE ORAL at 14:17

## 2017-07-07 RX ADMIN — Medication 10 UNIT(S): at 11:21

## 2017-07-07 RX ADMIN — Medication 10 UNIT(S): at 08:42

## 2017-07-07 RX ADMIN — Medication 145 MILLIGRAM(S): at 11:22

## 2017-07-07 NOTE — PROGRESS NOTE ADULT - PROBLEM SELECTOR PLAN 4
Uncontrolled DM-2 with hyperglycemia, A1c 12.1, FS fluctuate inpt, ? dietary indiscretions, Diabetes education  c/w Insulin  Trend FS

## 2017-07-07 NOTE — PROGRESS NOTE ADULT - PROBLEM SELECTOR PROBLEM 2
Dark stools
Abdominal pain
Cirrhosis of liver without ascites, unspecified hepatic cirrhosis type
Dark stools

## 2017-07-07 NOTE — PROGRESS NOTE ADULT - PROBLEM SELECTOR PLAN 1
-MRCP performed, noting cirrhosis & acute pancreatitis  -daily labs  -pain control prn  -ppi qd  -hepatitis panel negative  -liver serologies negative  -low fat diet
-MRCP performed, noting cirrhosis & acute pancreatitis  -daily labs  -pain control prn  -ppi qd  -hepatitis panel negative  -liver serologies negative  -low fat diet  -pain improved since admission with hydration, discussed at length with patient to fu with Dr. Rogers for outpatient EGD in 2 weeks  -dc per primary team
-MRCP performed, noting cirrhosis & acute pancreatitis  -daily labs  -pain control prn  -ppi qd  -hepatitis panel negative  -liver serologies negative  -low fat diet  -pain improved since admission with hydration, discussed at length with patient to fu with Dr. Rogers for outpatient EGD in 2 weeks  -dc per primary team as d/w patient
-US Abd reviewed with possible Cirrhosis  -daily labs  -simethicone q6h  -ppi qd  -hepatitis panel testing  -liver serologies testing  -MRCP performed, awaiting official results  -low fat diet
as seen on MRI, c/w pain control   IVF
as seen on MRI, c/w pain control   IVF, pain improved, advance diet  No plan for EGD inpt, f/u outpt
as seen on MRI, c/w pain control   IVF, will keep pt NPO for now  No plan for EGD inpt
ruled out for MI with negative cardiac enzymes, no further cardiac workup as per cardiology  continue ASA, statin
-US Abd reviewed with possible Cirrhosis  -daily labs  -simethicone q6h  -ppi qd  -hepatitis panel testing  -liver serologies testing  -MRCP pending   -low fat diet

## 2017-07-07 NOTE — PROGRESS NOTE ADULT - SUBJECTIVE AND OBJECTIVE BOX
Patient is a 66y old  Male who presents with a chief complaint of chest pain (05 Jul 2017 12:41)      SUBJECTIVE / OVERNIGHT EVENTS: Improved abd pain    MEDICATIONS  (STANDING):  aspirin enteric coated 81 milliGRAM(s) Oral daily  fenofibrate Tablet 145 milliGRAM(s) Oral daily  gabapentin 400 milliGRAM(s) Oral every 8 hours  montelukast 10 milliGRAM(s) Oral daily  omega-3-Acid Ethyl Esters 2 Gram(s) Oral two times a day  simvastatin 20 milliGRAM(s) Oral at bedtime  cyanocobalamin 500 MICROGram(s) Oral daily  enoxaparin Injectable 40 milliGRAM(s) SubCutaneous every 24 hours  insulin lispro (HumaLOG) corrective regimen sliding scale   SubCutaneous three times a day before meals  insulin lispro (HumaLOG) corrective regimen sliding scale   SubCutaneous at bedtime  dextrose 50% Injectable 12.5 Gram(s) IV Push once  dextrose 50% Injectable 25 Gram(s) IV Push once  dextrose 50% Injectable 25 Gram(s) IV Push once  pantoprazole    Tablet 40 milliGRAM(s) Oral before breakfast  docusate sodium 100 milliGRAM(s) Oral daily  senna 2 Tablet(s) Oral at bedtime  insulin lispro Injectable (HumaLOG) 10 Unit(s) SubCutaneous three times a day before meals  insulin glargine Injectable (LANTUS) 20 Unit(s) SubCutaneous two times a day  sodium chloride 0.9%. 1000 milliLiter(s) (100 mL/Hr) IV Continuous <Continuous>    MEDICATIONS  (PRN):  dextrose Gel 1 Dose(s) Oral once PRN Blood Glucose LESS THAN 70 milliGRAM(s)/deciliter  glucagon  Injectable 1 milliGRAM(s) IntraMuscular once PRN Glucose LESS THAN 70 milligrams/deciliter      Vital Signs Last 24 Hrs  T(C): 36.9 (07-07-17 @ 05:20), Max: 36.9 (07-07-17 @ 05:20)  HR: 74 (07-07-17 @ 05:20) (66 - 74)  BP: 125/72 (07-07-17 @ 05:20) (125/72 - 150/77)  RR: 18 (07-07-17 @ 05:20) (16 - 18)  SpO2: 99% (07-07-17 @ 05:20) (98% - 99%)  CAPILLARY BLOOD GLUCOSE  304 (07 Jul 2017 11:09)  194 (07 Jul 2017 07:54)  271 (06 Jul 2017 21:32)  242 (06 Jul 2017 16:58)        I&O's Summary    07 Jul 2017 07:01  -  07 Jul 2017 14:17  --------------------------------------------------------  IN: 360 mL / OUT: 0 mL / NET: 360 mL        PHYSICAL EXAM:  GENERAL: NAD,   HEAD:  Normocephalic  EYES: EOMI,  conjunctiva and sclera clear  NECK: Supple,   CHEST/LUNG: Clear to auscultation bilaterally; No wheeze  HEART:  s1 s2 + Regular rate and rhythm;   ABDOMEN: Soft, Nontender, Nondistended; Bowel sounds present  EXTREMITIES:   No clubbing, cyanosis  NEURO: AAOx3      LABS:                        11.7   3.50  )-----------( 148      ( 07 Jul 2017 06:05 )             36.8     07-07    139  |  100  |  12  ----------------------------<  207<H>  4.0   |  26  |  0.78    Ca    9.9      07 Jul 2017 06:05  Mg     1.8     07-07        Care Discussed with Consultants/Other Providers: Dr. Valles ( Gi)

## 2017-07-07 NOTE — PROGRESS NOTE ADULT - ASSESSMENT
67 yo male PMHx of Uncontrolled DM, HLD, Appendectomy and Diabetic Neuropathy p/w Right sided chest pain radiating to R sided abdomen for past 3 days. US Abd revealed No cholelithiasis or sonographic evidence of acute cholecystitis and Somewhat nodular contour of the liver, which may represent cirrhosis. LFTs were within normal limits on lab work and Lipase is mildly elevated. r/o biliary colic vs pancreatitis . MRCP noted with acute interstitial pancreatitis and normal biliary tree

## 2017-07-07 NOTE — PROGRESS NOTE ADULT - SUBJECTIVE AND OBJECTIVE BOX
INTERVAL HPI/OVERNIGHT EVENTS:    pt reports that he is still feeling some abdominal discomfort, however, is refusing to remain NPO for bowel rest   denying n/v/d/c   tolerating PO intake     MEDICATIONS  (STANDING):  aspirin enteric coated 81 milliGRAM(s) Oral daily  fenofibrate Tablet 145 milliGRAM(s) Oral daily  gabapentin 400 milliGRAM(s) Oral every 8 hours  montelukast 10 milliGRAM(s) Oral daily  omega-3-Acid Ethyl Esters 2 Gram(s) Oral two times a day  simvastatin 20 milliGRAM(s) Oral at bedtime  cyanocobalamin 500 MICROGram(s) Oral daily  enoxaparin Injectable 40 milliGRAM(s) SubCutaneous every 24 hours  insulin lispro (HumaLOG) corrective regimen sliding scale   SubCutaneous three times a day before meals  insulin lispro (HumaLOG) corrective regimen sliding scale   SubCutaneous at bedtime  dextrose 50% Injectable 12.5 Gram(s) IV Push once  dextrose 50% Injectable 25 Gram(s) IV Push once  dextrose 50% Injectable 25 Gram(s) IV Push once  pantoprazole    Tablet 40 milliGRAM(s) Oral before breakfast  docusate sodium 100 milliGRAM(s) Oral daily  senna 2 Tablet(s) Oral at bedtime  insulin lispro Injectable (HumaLOG) 10 Unit(s) SubCutaneous three times a day before meals  insulin glargine Injectable (LANTUS) 20 Unit(s) SubCutaneous two times a day  sodium chloride 0.9%. 1000 milliLiter(s) (100 mL/Hr) IV Continuous <Continuous>    MEDICATIONS  (PRN):  dextrose Gel 1 Dose(s) Oral once PRN Blood Glucose LESS THAN 70 milliGRAM(s)/deciliter  glucagon  Injectable 1 milliGRAM(s) IntraMuscular once PRN Glucose LESS THAN 70 milligrams/deciliter      Allergies    No Known Allergies    Intolerances    Motrin (Stomach Upset)      Review of Systems:    General:  No wt loss, fevers, chills, night sweats,fatigue,   Eyes:  Good vision, no reported pain  ENT:  No sore throat, pain, runny nose, dysphagia  CV:  No pain, palpitatioins, hypo/hypertension  Resp:  No dyspnea, cough, tachypnea, wheezing  GI:  No pain, No nausea, No vomiting, No diarrhea, No constipation, No weight loss, No fever, No pruritis, No rectal bleeding, No melenas, No dysphagia  :  No pain, bleeding, incontinence, nocturia  Muscle:  No pain, weakness  Neuro:  No weakness, tingling, memory problems  Psych:  No fatigue, insomnia, mood problems, depression  Endocrine:  No polyuria, polydypsia, cold/heat intolerance  Heme:  No petechiae, ecchymosis, easy bruisability  Skin:  No rash, tattoos, scars, edema      Vital Signs Last 24 Hrs  T(C): 36.9 (07 Jul 2017 05:20), Max: 36.9 (07 Jul 2017 05:20)  T(F): 98.5 (07 Jul 2017 05:20), Max: 98.5 (07 Jul 2017 05:20)  HR: 74 (07 Jul 2017 05:20) (66 - 74)  BP: 125/72 (07 Jul 2017 05:20) (125/72 - 150/77)  BP(mean): --  RR: 18 (07 Jul 2017 05:20) (16 - 18)  SpO2: 99% (07 Jul 2017 05:20) (98% - 99%)    PHYSICAL EXAM:    Constitutional: NAD, well-developed  HEENT: EOMI, throat clear  Neck: No LAD, supple  Respiratory: CTA and P  Cardiovascular: S1 and S2, RRR, no M  Gastrointestinal: BS+, soft, NT/ND, neg HSM,  Extremities: No peripheral edema, neg clubing, cyanosis  Vascular: 2+ peripheral pulses  Neurological: A/O x 3, no focal deficits  Psychiatric: Normal mood, normal affect  Skin: No rashes      LABS:                        11.7   3.50  )-----------( 148      ( 07 Jul 2017 06:05 )             36.8     07-07    139  |  100  |  12  ----------------------------<  207<H>  4.0   |  26  |  0.78    Ca    9.9      07 Jul 2017 06:05  Mg     1.8     07-07            RADIOLOGY & ADDITIONAL TESTS:

## 2017-07-07 NOTE — PROGRESS NOTE ADULT - PROBLEM SELECTOR PLAN 2
-?dark stools intermittently over past few months  -cbc qd; hgb stable  -guaiac -- > pending  -ppi qd  -monitor stool color  -may need EGD inpatient vs outpatient with Dr. Rogers
-?dark stools intermittently over past few months; since admission [facility]  -cbc qd; hgb stable  -guaiac negative   -ppi qd  - discussed at length with patient to fu outpatient fu with private gastroenterology, Dr. Rogers in 2 weeks for EGD  -dc per primary team
-?dark stools intermittently over past few months; since admission [facility]  -cbc qd; hgb stable  -guaiac negative   -ppi qd  -outpatient fu with private gastroenterology, Dr. Rogers
-since admission [facility] with stable hgb  -cbc qd; hgb stable  -guaiac negative   -ppi qd  - discussed at length with patient to fu outpatient fu with private gastroenterology, Dr. Rogers in 2 weeks for EGD  -dc per primary team
Right sided abdominal pain, unclear etiology  Awaiting MRCP, US showed nodular contour of the liver suggestive of possible cirrhosis.  May need EGD. C/w PPI. F/u GI recs  .  Consider GI consult
as seen on MRI  Outpt f/u with Dr. Harper
as seen on MRI  Outpt f/u with Dr. Rogers in 2 weeks
as seen on MRI  Outpt f/u with Dr. Rogers in 2 weeks
-?dark stools intermittently over past few months  -cbc qd; hgb stable  -guaiac -- > pending  -ppi qd  -monitor stool color  -may need EGD inpatient vs outpatient with Dr. Rogers

## 2017-07-19 ENCOUNTER — APPOINTMENT (OUTPATIENT)
Dept: ENDOCRINOLOGY | Facility: CLINIC | Age: 67
End: 2017-07-19

## 2017-09-13 ENCOUNTER — APPOINTMENT (OUTPATIENT)
Dept: ENDOCRINOLOGY | Facility: CLINIC | Age: 67
End: 2017-09-13

## 2017-10-05 ENCOUNTER — RESULT REVIEW (OUTPATIENT)
Age: 67
End: 2017-10-05

## 2018-03-01 ENCOUNTER — OUTPATIENT (OUTPATIENT)
Dept: OUTPATIENT SERVICES | Facility: HOSPITAL | Age: 68
LOS: 1 days | End: 2018-03-01
Payer: MEDICAID

## 2018-03-01 DIAGNOSIS — Z98.890 OTHER SPECIFIED POSTPROCEDURAL STATES: Chronic | ICD-10-CM

## 2018-03-01 DIAGNOSIS — Z98.89 OTHER SPECIFIED POSTPROCEDURAL STATES: Chronic | ICD-10-CM

## 2018-03-01 PROCEDURE — G9001: CPT

## 2018-03-07 DIAGNOSIS — R69 ILLNESS, UNSPECIFIED: ICD-10-CM

## 2019-07-23 NOTE — ED CDU PROVIDER NOTE - CONTEXT
Med rec updated and complete. Allergies reviewed.  Per family. No oral  Antibiotic use in last 14 days at home.  Home pharmacy [facility] dr.   unknown

## 2020-03-12 NOTE — ED ADULT NURSE NOTE - BREATHING, MLM
Has tried conservative measures for more than  months with compression stockings, 20-30 mmHg.  Does not say how long? Please ask Dr. Saleh to do addendum. Thanks.   Spontaneous, unlabored and symmetrical

## 2021-04-01 NOTE — ED CDU PROVIDER NOTE - CONSTITUTIONAL, MLM
COVID-19 normal... Well appearing, well nourished, awake, alert, oriented to person, place, time/situation and in no apparent distress. BMP/CBC/EKG/COVID-19

## 2022-10-04 NOTE — ED CDU PROVIDER NOTE - MEDICAL DECISION MAKING DETAILS
Called and talked to patient she is scheduled for her surgical procedure on 10/21/22 with Dr Virginia Simpson at the [facility]  Bronchitis, uncontrolled DM- abx, duonebs, endo consult, serial finger sticks

## 2023-01-01 NOTE — ED ADULT NURSE NOTE - NS PRO PASSIVE SMOKE EXP
I have reviewed the notes, assessments, and/or procedures performed by Chana Adams RN, IBCLC, I concur with her/his documentation of Enedina Dyson MD 05/29/23 No

## 2024-06-05 NOTE — ED PROVIDER NOTE - CONSTITUTIONAL, MLM
Medication pended for your review and approval.    Betamethasone could only be found data base in external ointment form.     normal... Well appearing, well nourished, awake, alert, oriented to person, place, time/situation and in no apparent distress.

## 2025-01-31 NOTE — H&P ADULT - PROBLEM SELECTOR PROBLEM 1
OPERATIVE NOTE    Beny Mohamud  [id number]      PREOP DIAGNOSIS:  1) Laryngeal cancer  2) Right parotid mass    POSTOP DIAGNOSIS:   Same.    PROCEDURES PERFORMED:  1) Right parotidectomy  2) Laryngectomy, right thyroid lobectomy  3) Bilateral neck dissections  4) Tracheoesophageal puncture    SURGEON: Fabien Donnelly M.D.    Assistant: Ally Collado NP    ANESTHESIA: General endotracheal.    FLUIDS: Per Anesthesia.    EBL: 60 mL    COMPLICATIONS: None apparent.    DISPO: Stable to PACU.    INDICATIONS FOR PROCEDURE: Beny Mohamud is a 70 year old male patient with history of laryngeal cancer and right parotid mass. After an extensive discussion of the risks, benefits, and alternatives of the surgery including but not limited to infection, pain, bleeding, the need for revision surgeries, prolonged wound healing, scarring, nerve injury, Beny Mohamud asked appropriate questions and decided to proceed with surgery.  Beny Mohamud signed the surgical consent.     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 as well as 1 cm subglottic involvement    SPECIMENS:   ID Type Source Tests Collected by Time   A : right parotid mass Tissue Parotid Gland, Right SURGICAL PATHOLOGY Fabien Donnelly MD 1/31/2025 0816   B : larynx, right thyroid lobe Tissue Larynx SURGICAL PATHOLOGY Fabien Donnelly MD 1/31/2025 0951   C : left level 2 Tissue Neck SURGICAL PATHOLOGY Fabien Donnelly MD 1/31/2025 0955   D : left level 3 Tissue Neck SURGICAL PATHOLOGY Fabien Donnelly MD 1/31/2025 1001   E : left level 4 Tissue Neck SURGICAL PATHOLOGY Fabien Donnelly MD 1/31/2025 1001   F : right level 2 Tissue Neck SURGICAL PATHOLOGY Fabien Donnelly MD 1/31/2025 1010   G : right level 3 and 4 Tissue Neck SURGICAL PATHOLOGY Fabien Donnelly MD 1/31/2025 1023       Implant Name Type Inv. Item Serial No.  Lot No. LRB No. Used Action   APPLIER INTERNAL CLIP SM L9.3 IN 20 MLT OPEN LIGATE LIGACLIP -  [id number] Other Implant APPLIER INTERNAL CLIP SM L9.3 IN 20 MLT OPEN LIGATE LIGACLIP  [employer] [id number] N/A 1 Implanted   APPLIER INTERNAL CLIP MED L9.3 IN 20 MLT OPEN LIGATE - [id number] Other Implant APPLIER INTERNAL CLIP MED L9.3 IN 20 MLT OPEN LIGATE  [employer] [id number] N/A 2 Implanted   APPLIER INTERNAL CLIP SM L9.3 IN 20 MLT OPEN LIGATE LIGACLIP - [id number] Other Implant APPLIER INTERNAL CLIP SM L9.3 IN 20 MLT OPEN LIGATE LIGACLIP  [employer] [id number] Right 1 Implanted       Assistant Tasks:   OpTasksPerformed: Opening and closing and Altering tissue    DESCRIPTION OF THE PROCEDURE IN DETAIL: Beny Mohamud was brought to the operating room and placed in the supine position on the operating table.  A time-out was performed verifying the correct patient, site, and special equipment as needed. The anesthesiologists induced general anesthesia and endotracheally intubated the patient. The head of the bed was turned 180 degrees.  The patient was draped in the usual fashion.      The neck, oral cavity, and oropharynx was thoroughly examined visually and by palpation. Precautions were taken to avoid if possible dental and oral mucosa trauma. The dedo laryngoscope was introduced into the oral cavity and allowed for examination of the oral cavity, oropharynx, hypopharynx and larynx.  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 Piriforms and vallecula uninvolved. The scope was removed.    We started by addressing the superior superficial right parotid mass.  An incision was planned along right preauricular region.  The skin incision was accomplished with a 15 blade.  Skin flaps were elevated using skin hook retraction and sharp dissection, the flaps were retracted with skin hooks. The parotid fascia was identified and the mass was just below this.  The parotid fascia was divided  around this mass.  Given distal location of the mass to the facial nerve, we felt capsular dissection with retrograde facial nerve dissection would be safest for main trunk facial nerve function.  The  mass was dissected from the adjacent parotid tissue.  A midface branch of the facial nerve was identified and preserved.  The mass was elevated off this branch of the nerve.  The mass was resected in its entirety and submitted for permanent section.  The wound was irrigated.  A small piece of surgicel was placed in the wound.  The wound was then closed in multiple layers using Vicryl and 5-0 FAST gut.     Next, we turned our attention toward the laryngectomy.   An apron incision was planned along the neck.  This was injected with 1% lidocaine with 1:100,000 epinephrine.  The incision was made with a 15 blade.  Subplatysmal flaps were elevated superior to the level of the hyoid.  The larynx was skeletonized medially away from the carotid sheaths lateral.  Inferiorly the strap muscles were divided and then the left lobes of the thyroid gland was  from the trachea and the thyroid cartilage.  The right thyroid lobe was resected along with the larynx.  The parathyroid tissue was preserved and bluntly retracted laterally.  There were no visibly enlarged lymph nodes.  A few lymph nodes from the central neck were resected as part of the laryngectomy specimen.  The right superior thyroid vessels and recurrent laryngeal nerve were divided.  The right thyroid lobe was resected to help assure clear margins around the tumor.     Superiorly the hyoid and superior thyroid horns were identified and freed from muscular attachments with division of the inferior constrictor muscles along the lateral borders of the thyroid cartilage. Superiorly the suprahyoid muscles were divided in the midline following the top edge of the hyoid bone. This was subsequently extended laterally to the horns of the hyoid bone with the muscular  attachments being divided. Following this the pharynx was entered just superior to the tip of the epiglottis in the vallecula. The pharyngeal mucosa was incised under direct vision noting the location of the tumor along the right vocal fold in the endolarynx. The mucosa along the pyriform sinuses could be divided with a healthy margin away from the cancer. Following this the trachea was incised just below the second tracheal ring. The endotracheal tube was withdrawn and inserted into this new tracheotomy. Following this the rest of the posterior wall of the trachea was incised identifying the plane between the trachea and esophagus. This was followed up to the posterior cricoid area where the posterior cricoid mucosa was then incised releasing the total laryngectomy specimen. This was examined carefully noting that the margins looked grossly clear around the tumor and really no palpable evidence of tumor extending outside of the larynx. The specimen was submitted which included the larynx, right thyroid lobe and central neck ebony tissue.     The trachea stoma was created by a incising skin of the lower neck circumferentially and removing the skin and subcutaneous tissue -- this was separate from the neck incision.  The SCM was partially divided inferiorly bilaterally. The trachea was then secured to the skin circumferentially with 3-0 Vicryl erasmo-vertical mattress sutures followed by chromic gut suture.     Then the tracheoesophageal puncture was performed by marking the location on the posterior tracheal wall and using the [employer] primary puncture kit the puncture needle catheter was introduced through the wall of the trachea and into the esophagus and a wire threaded through this. The needle was then removed and the dilator and prosthesis were threaded over the wire and withdrawn through the puncture site to dilate and place the prosthesis. The prosthesis was confirmed to be in a good position on the esophageal  and tracheal walls without undue pressure.    Following this the pharyngo-esophageal wound was primarily closed with the stapler.  This was reinforced by Vicryl sutures superiorly and inferiorly.      We next turned our attention toward the neck dissections.  First on the left:  A #15 blade was used to extend the incision superiorly on the left neck. The platysma was divided. Subplatysmal flaps were raised superiorly and inferiorly. The posterior digastric was followed posteriorly and the soft tissue lateral to it was divided. The spinal accessory nerve and internal jugular vein were identified and preserved. The spinal accessory nerve was followed inferolaterally toward its insertion into the SCM muscle. Next the fascia adjacent to the SCM muscle was divided. Dissection proceeded down to the cervical rootlets. Inferiorly, the omohyoid muscle was retracted. The specimens in proximity to the internal jugular vein were ligated with hemoclips to prevent chyle leak.  A tenotomy scissor was used to take all lymphatics down to the floor of the neck.  The specimen was taken anteriorly off the cervical rootlets and up and over the internal jugular vein.  The specimen was released and submitted for permanent section. The wound was irrigated. There was no significant bleeding. A 15 round drain was placed medial to the SCM.      Next on the right:  A #15 blade was used to extend the incision superiorly on the right neck. The platysma was divided. Subplatysmal flaps were raised superiorly and inferiorly. The posterior digastric was followed posteriorly and the soft tissue lateral to it was divided. The spinal accessory nerve and internal jugular vein were identified and preserved. The spinal accessory nerve was followed inferolaterally toward its insertion into the SCM muscle. Next the fascia adjacent to the SCM muscle was divided. Dissection proceeded down to the cervical rootlets. Inferiorly, the omohyoid muscle was  retracted. The specimens in proximity to the internal jugular vein were ligated with hemoclips to prevent chyle leak.  A tenotomy scissor was used to take all lymphatics down to the floor of the neck.  The specimen was taken anteriorly off the cervical rootlets and up and over the internal jugular vein.  The specimen was released and submitted for permanent section. The wound was irrigated. There was no significant bleeding. A 15 round drain was placed medial to the SCM.      Hemostasis was achieved. The neck was closed with 3-0 Vicryl in platysmal and deep dermal layers as well as staples to reapproximate the skin edges.  Ointment was applied.      This concluded the procedure. All surgical counts were correct. The patient tolerated the procedure well without any apparent complications. The patient was returned to anesthesia, awoken, and extubated.  The patient was brought to the recovery room breathing comfortably in stable condition.    I determined it would be necessary to have an qualified provider assist me with this case, because of the need for expert assistance.  Ally Collado NP provided me with both intellectual and technical assistance during the following aspects of the case:  parotidectomy, bilateral neck dissections, tracheoesophageal puncture, laryngectomy.   No resident was available to assist.      Melchor Donnelly M.D.       Atypical chest pain